# Patient Record
Sex: MALE | Race: WHITE | NOT HISPANIC OR LATINO | Employment: FULL TIME | URBAN - METROPOLITAN AREA
[De-identification: names, ages, dates, MRNs, and addresses within clinical notes are randomized per-mention and may not be internally consistent; named-entity substitution may affect disease eponyms.]

---

## 2020-05-14 ENCOUNTER — LAB VISIT (OUTPATIENT)
Dept: PRIMARY CARE CLINIC | Facility: CLINIC | Age: 30
End: 2020-05-14
Payer: COMMERCIAL

## 2020-05-14 DIAGNOSIS — Z00.6 RESEARCH STUDY PATIENT: Primary | ICD-10-CM

## 2020-05-14 PROCEDURE — U0002 COVID-19 LAB TEST NON-CDC: HCPCS

## 2020-05-14 PROCEDURE — 86769 SARS-COV-2 COVID-19 ANTIBODY: CPT

## 2020-05-14 NOTE — RESEARCH
Date of Consent: 05/14/2020    Sponsor: [facility]    Study Title/IRB Number: Observational study of Sars-CoV2 Immunoglobulin G (IgG) seroprevalence among the [facility] population over time 2020.163  Principle Investigator: Alfreda Love, PhD    Did the patient need translation services? No   name: N/A    Prior to the Informed Consent (IC) being signed, or any study protocol required data collection, testing, procedure, or intervention being performed, the following was done and/or discussed:   Patient was given a paper copy of the IC for review    Patient was given study FAQ   Purpose of the study and qualifications to participate    Study design and tests or procedures done at this visit   Confidentiality and HIPAA Authorization for Release of Medical Records for the research trial/ subject's rights/research related injury   Risk, Benefits, Alternative Treatments, Compensation and Costs   Participation in the research trial is voluntary and patient may withdraw at anytime   Contact information for study related questions    Patient verbalizes understanding of the above: Yes  Contact information for PI and IRB given to patient: Yes  Patient able to adequately summarize: the purpose of the study, the risks associated with the study, and all procedures, testing, and follow-ups associated with the study: Yes    The consent was discussed verbally with the patient and all questions were answered satisfactorily. Patient gave verbal consent for the Seroprevalence research study with an IRB approval date of 05/08/2020.      The Consent, Consent Witness and name of Clinical Research Coordinator consenting was captured and documented in REDCap.    All Inclusion and Exclusion Criteria reviewed, subject meets all Inclusion criteria and does not meet any Exclusion Criteria at this time.     Patient Eligibility was confirmed.    Patient responded to survey questions.    The following biospecimen  collection procedures were collected:    -Nasopharyngeal Swab Collection  -Blood collection

## 2020-05-15 LAB
SARS-COV-2 IGG SERPLBLD QL IA.RAPID: NEGATIVE
SARS-COV-2 RNA RESP QL NAA+PROBE: NOT DETECTED

## 2020-07-10 ENCOUNTER — OFFICE VISIT (OUTPATIENT)
Dept: OPTOMETRY | Facility: CLINIC | Age: 30
End: 2020-07-10
Attending: SURGERY
Payer: COMMERCIAL

## 2020-07-10 DIAGNOSIS — H52.4 MYOPIA WITH ASTIGMATISM AND PRESBYOPIA, BILATERAL: Primary | ICD-10-CM

## 2020-07-10 DIAGNOSIS — H52.203 MYOPIA WITH ASTIGMATISM AND PRESBYOPIA, BILATERAL: Primary | ICD-10-CM

## 2020-07-10 DIAGNOSIS — H17.9 CORNEAL SCAR: ICD-10-CM

## 2020-07-10 DIAGNOSIS — H18.823 CONTACT LENS OVERWEAR OF BOTH EYES: ICD-10-CM

## 2020-07-10 DIAGNOSIS — H52.13 MYOPIA WITH ASTIGMATISM AND PRESBYOPIA, BILATERAL: Primary | ICD-10-CM

## 2020-07-10 PROCEDURE — 92310 PR CONTACT LENS FITTING (NO CHANGE): ICD-10-PCS | Mod: CSM,S$GLB,, | Performed by: OPTOMETRIST

## 2020-07-10 PROCEDURE — 99999 PR PBB SHADOW E&M-EST. PATIENT-LVL II: CPT | Mod: PBBFAC,,, | Performed by: OPTOMETRIST

## 2020-07-10 PROCEDURE — 92015 DETERMINE REFRACTIVE STATE: CPT | Mod: S$GLB,,, | Performed by: OPTOMETRIST

## 2020-07-10 PROCEDURE — 99999 PR PBB SHADOW E&M-EST. PATIENT-LVL II: ICD-10-PCS | Mod: PBBFAC,,, | Performed by: OPTOMETRIST

## 2020-07-10 PROCEDURE — 92004 PR EYE EXAM, NEW PATIENT,COMPREHESV: ICD-10-PCS | Mod: S$GLB,,, | Performed by: OPTOMETRIST

## 2020-07-10 PROCEDURE — 92004 COMPRE OPH EXAM NEW PT 1/>: CPT | Mod: S$GLB,,, | Performed by: OPTOMETRIST

## 2020-07-10 PROCEDURE — 92015 PR REFRACTION: ICD-10-PCS | Mod: S$GLB,,, | Performed by: OPTOMETRIST

## 2020-07-10 PROCEDURE — 92310 CONTACT LENS FITTING OU: CPT | Mod: CSM,S$GLB,, | Performed by: OPTOMETRIST

## 2020-07-10 NOTE — PROGRESS NOTES
HPI     Mr. Hollis Miranda II was referred by self for a CL fit.    Patient complains of needs new Rx for contacts and glasses. Wearing   biofinity toric. Likes comfort and vision with CLs.     Would patient like a refraction today? yes     Paitent denies diplopia, headaches, flashes/floaters, itching, tearing,   burning, redness, and pain.    (-)drops    (-)diabetes    OCULAR HISTORY  Last Eye Exam: 1 year with yovany  (-)eye surgery   (-)diagnosed or treated for any eye conditions or diseases, n/a    FAMILY HISTORY  (-)Glaucoma        Last edited by Yaneth Sotelo, OD on 7/10/2020  2:25 PM. (History)            Assessment /Plan     For exam results, see Encounter Report.    Myopia with astigmatism and presbyopia, bilateral    Contact lens overwear of both eyes    Corneal scar      Updated SRx. Monitor yearly.   Updated CL Rx, no change from habitual. Great fit and vision. Signs of [place] OU. Pt reports to not sleep in lenses, however, showing multiple signs of CL abuse. Corneal scarring OU similar to old infiltrate/ulcer however pt denies any past infections.   He notes long days wear in CL, such 16-18 hours. He is not interested in changing to a daily CL even after education on the benefits. Recommend inserting CLs right before work and removing when home from work. Also recommend CL holidays on the weekend. Re-educated on proper CL care and hygiene. Monitor yearly.       RTC in 1 year for annual eye exam or sooner if needed.

## 2021-02-24 ENCOUNTER — OFFICE VISIT (OUTPATIENT)
Dept: URGENT CARE | Facility: CLINIC | Age: 31
End: 2021-02-24
Payer: COMMERCIAL

## 2021-02-24 VITALS
OXYGEN SATURATION: 96 % | TEMPERATURE: 102 F | BODY MASS INDEX: 23.8 KG/M2 | WEIGHT: 170 LBS | RESPIRATION RATE: 18 BRPM | SYSTOLIC BLOOD PRESSURE: 129 MMHG | DIASTOLIC BLOOD PRESSURE: 78 MMHG | HEART RATE: 132 BPM | HEIGHT: 71 IN

## 2021-02-24 DIAGNOSIS — J02.9 SORE THROAT: ICD-10-CM

## 2021-02-24 DIAGNOSIS — R50.9 FEVER, UNSPECIFIED FEVER CAUSE: ICD-10-CM

## 2021-02-24 DIAGNOSIS — R53.83 FATIGUE, UNSPECIFIED TYPE: ICD-10-CM

## 2021-02-24 DIAGNOSIS — J02.0 STREP PHARYNGITIS: Primary | ICD-10-CM

## 2021-02-24 LAB
CTP QC/QA: YES
HETEROPH AB SER QL: NEGATIVE
MOLECULAR STREP A: POSITIVE
POC MOLECULAR INFLUENZA A AGN: NEGATIVE
POC MOLECULAR INFLUENZA B AGN: NEGATIVE
SARS-COV-2 RDRP RESP QL NAA+PROBE: NEGATIVE

## 2021-02-24 PROCEDURE — U0002: ICD-10-PCS | Mod: QW,S$GLB,, | Performed by: NURSE PRACTITIONER

## 2021-02-24 PROCEDURE — 99203 OFFICE O/P NEW LOW 30 MIN: CPT | Mod: S$GLB,,, | Performed by: NURSE PRACTITIONER

## 2021-02-24 PROCEDURE — 86308 POCT INFECTIOUS MONONUCLEOSIS: ICD-10-PCS | Mod: QW,S$GLB,, | Performed by: NURSE PRACTITIONER

## 2021-02-24 PROCEDURE — 87651 STREP A DNA AMP PROBE: CPT | Mod: QW,S$GLB,, | Performed by: NURSE PRACTITIONER

## 2021-02-24 PROCEDURE — 99203 PR OFFICE/OUTPT VISIT, NEW, LEVL III, 30-44 MIN: ICD-10-PCS | Mod: S$GLB,,, | Performed by: NURSE PRACTITIONER

## 2021-02-24 PROCEDURE — 3008F BODY MASS INDEX DOCD: CPT | Mod: CPTII,S$GLB,, | Performed by: NURSE PRACTITIONER

## 2021-02-24 PROCEDURE — 87502 POCT INFLUENZA A/B MOLECULAR: ICD-10-PCS | Mod: QW,S$GLB,, | Performed by: NURSE PRACTITIONER

## 2021-02-24 PROCEDURE — 87502 INFLUENZA DNA AMP PROBE: CPT | Mod: QW,S$GLB,, | Performed by: NURSE PRACTITIONER

## 2021-02-24 PROCEDURE — U0002 COVID-19 LAB TEST NON-CDC: HCPCS | Mod: QW,S$GLB,, | Performed by: NURSE PRACTITIONER

## 2021-02-24 PROCEDURE — 87651 POCT STREP A MOLECULAR: ICD-10-PCS | Mod: QW,S$GLB,, | Performed by: NURSE PRACTITIONER

## 2021-02-24 PROCEDURE — 86308 HETEROPHILE ANTIBODY SCREEN: CPT | Mod: QW,S$GLB,, | Performed by: NURSE PRACTITIONER

## 2021-02-24 PROCEDURE — 3008F PR BODY MASS INDEX (BMI) DOCUMENTED: ICD-10-PCS | Mod: CPTII,S$GLB,, | Performed by: NURSE PRACTITIONER

## 2021-02-24 RX ORDER — ACETAMINOPHEN 500 MG
1000 TABLET ORAL
Status: COMPLETED | OUTPATIENT
Start: 2021-02-24 | End: 2021-02-24

## 2021-02-24 RX ORDER — PENICILLIN V POTASSIUM 500 MG/1
500 TABLET, FILM COATED ORAL 2 TIMES DAILY
Qty: 20 TABLET | Refills: 0 | Status: SHIPPED | OUTPATIENT
Start: 2021-02-24 | End: 2022-05-18

## 2021-02-24 RX ORDER — PENICILLIN V POTASSIUM 500 MG/1
500 TABLET, FILM COATED ORAL 2 TIMES DAILY
Qty: 20 TABLET | Refills: 0 | Status: SHIPPED | OUTPATIENT
Start: 2021-02-24 | End: 2021-02-24

## 2021-02-24 RX ADMIN — Medication 1000 MG: at 08:02

## 2021-04-15 ENCOUNTER — PATIENT MESSAGE (OUTPATIENT)
Dept: RESEARCH | Facility: HOSPITAL | Age: 31
End: 2021-04-15

## 2021-08-04 ENCOUNTER — IMMUNIZATION (OUTPATIENT)
Dept: PRIMARY CARE CLINIC | Facility: CLINIC | Age: 31
End: 2021-08-04
Payer: COMMERCIAL

## 2021-08-04 DIAGNOSIS — Z23 NEED FOR VACCINATION: Primary | ICD-10-CM

## 2021-08-04 PROCEDURE — 0001A COVID-19, MRNA, LNP-S, PF, 30 MCG/0.3 ML DOSE VACCINE: ICD-10-PCS | Mod: CV19,S$GLB,, | Performed by: INTERNAL MEDICINE

## 2021-08-04 PROCEDURE — 0001A COVID-19, MRNA, LNP-S, PF, 30 MCG/0.3 ML DOSE VACCINE: CPT | Mod: CV19,S$GLB,, | Performed by: INTERNAL MEDICINE

## 2021-08-04 PROCEDURE — 91300 COVID-19, MRNA, LNP-S, PF, 30 MCG/0.3 ML DOSE VACCINE: CPT | Mod: S$GLB,,, | Performed by: INTERNAL MEDICINE

## 2021-08-04 PROCEDURE — 91300 COVID-19, MRNA, LNP-S, PF, 30 MCG/0.3 ML DOSE VACCINE: ICD-10-PCS | Mod: S$GLB,,, | Performed by: INTERNAL MEDICINE

## 2021-09-24 ENCOUNTER — IMMUNIZATION (OUTPATIENT)
Dept: PRIMARY CARE CLINIC | Facility: CLINIC | Age: 31
End: 2021-09-24
Payer: COMMERCIAL

## 2021-09-24 DIAGNOSIS — Z23 NEED FOR VACCINATION: Primary | ICD-10-CM

## 2021-09-24 PROCEDURE — 91300 COVID-19, MRNA, LNP-S, PF, 30 MCG/0.3 ML DOSE VACCINE: CPT | Mod: PBBFAC | Performed by: INTERNAL MEDICINE

## 2021-09-24 PROCEDURE — 0002A COVID-19, MRNA, LNP-S, PF, 30 MCG/0.3 ML DOSE VACCINE: CPT | Mod: CV19,PBBFAC | Performed by: INTERNAL MEDICINE

## 2021-11-19 ENCOUNTER — CLINICAL SUPPORT (OUTPATIENT)
Dept: URGENT CARE | Facility: CLINIC | Age: 31
End: 2021-11-19
Payer: COMMERCIAL

## 2021-11-19 DIAGNOSIS — Z01.812 ENCOUNTER FOR SCREENING LABORATORY TESTING FOR COVID-19 VIRUS IN ASYMPTOMATIC PATIENT: Primary | ICD-10-CM

## 2021-11-19 DIAGNOSIS — Z11.52 ENCOUNTER FOR SCREENING LABORATORY TESTING FOR COVID-19 VIRUS IN ASYMPTOMATIC PATIENT: Primary | ICD-10-CM

## 2021-11-19 LAB
CTP QC/QA: YES
SARS-COV-2 RDRP RESP QL NAA+PROBE: NEGATIVE

## 2021-11-19 PROCEDURE — U0002: ICD-10-PCS | Mod: QW,S$GLB,, | Performed by: STUDENT IN AN ORGANIZED HEALTH CARE EDUCATION/TRAINING PROGRAM

## 2021-11-19 PROCEDURE — 99211 PR OFFICE/OUTPT VISIT, EST, LEVL I: ICD-10-PCS | Mod: S$GLB,,, | Performed by: STUDENT IN AN ORGANIZED HEALTH CARE EDUCATION/TRAINING PROGRAM

## 2021-11-19 PROCEDURE — U0002 COVID-19 LAB TEST NON-CDC: HCPCS | Mod: QW,S$GLB,, | Performed by: STUDENT IN AN ORGANIZED HEALTH CARE EDUCATION/TRAINING PROGRAM

## 2021-11-19 PROCEDURE — 99211 OFF/OP EST MAY X REQ PHY/QHP: CPT | Mod: S$GLB,,, | Performed by: STUDENT IN AN ORGANIZED HEALTH CARE EDUCATION/TRAINING PROGRAM

## 2021-12-22 ENCOUNTER — OFFICE VISIT (OUTPATIENT)
Dept: URGENT CARE | Facility: CLINIC | Age: 31
End: 2021-12-22
Payer: COMMERCIAL

## 2021-12-22 VITALS
TEMPERATURE: 99 F | HEART RATE: 109 BPM | HEIGHT: 71 IN | WEIGHT: 175 LBS | DIASTOLIC BLOOD PRESSURE: 89 MMHG | SYSTOLIC BLOOD PRESSURE: 138 MMHG | BODY MASS INDEX: 24.5 KG/M2 | OXYGEN SATURATION: 97 % | RESPIRATION RATE: 18 BRPM

## 2021-12-22 DIAGNOSIS — J06.9 VIRAL URI: Primary | ICD-10-CM

## 2021-12-22 DIAGNOSIS — R05.9 COUGH: ICD-10-CM

## 2021-12-22 LAB
CTP QC/QA: YES
SARS-COV-2 RDRP RESP QL NAA+PROBE: NEGATIVE

## 2021-12-22 PROCEDURE — 99213 PR OFFICE/OUTPT VISIT, EST, LEVL III, 20-29 MIN: ICD-10-PCS | Mod: S$GLB,CS,, | Performed by: NURSE PRACTITIONER

## 2021-12-22 PROCEDURE — 1159F PR MEDICATION LIST DOCUMENTED IN MEDICAL RECORD: ICD-10-PCS | Mod: CPTII,S$GLB,, | Performed by: NURSE PRACTITIONER

## 2021-12-22 PROCEDURE — 1160F PR REVIEW ALL MEDS BY PRESCRIBER/CLIN PHARMACIST DOCUMENTED: ICD-10-PCS | Mod: CPTII,S$GLB,, | Performed by: NURSE PRACTITIONER

## 2021-12-22 PROCEDURE — 1159F MED LIST DOCD IN RCRD: CPT | Mod: CPTII,S$GLB,, | Performed by: NURSE PRACTITIONER

## 2021-12-22 PROCEDURE — 3008F BODY MASS INDEX DOCD: CPT | Mod: CPTII,S$GLB,, | Performed by: NURSE PRACTITIONER

## 2021-12-22 PROCEDURE — 3008F PR BODY MASS INDEX (BMI) DOCUMENTED: ICD-10-PCS | Mod: CPTII,S$GLB,, | Performed by: NURSE PRACTITIONER

## 2021-12-22 PROCEDURE — U0002 COVID-19 LAB TEST NON-CDC: HCPCS | Mod: QW,S$GLB,, | Performed by: NURSE PRACTITIONER

## 2021-12-22 PROCEDURE — U0002: ICD-10-PCS | Mod: QW,S$GLB,, | Performed by: NURSE PRACTITIONER

## 2021-12-22 PROCEDURE — 3079F PR MOST RECENT DIASTOLIC BLOOD PRESSURE 80-89 MM HG: ICD-10-PCS | Mod: CPTII,S$GLB,, | Performed by: NURSE PRACTITIONER

## 2021-12-22 PROCEDURE — 3075F PR MOST RECENT SYSTOLIC BLOOD PRESS GE 130-139MM HG: ICD-10-PCS | Mod: CPTII,S$GLB,, | Performed by: NURSE PRACTITIONER

## 2021-12-22 PROCEDURE — 3079F DIAST BP 80-89 MM HG: CPT | Mod: CPTII,S$GLB,, | Performed by: NURSE PRACTITIONER

## 2021-12-22 PROCEDURE — 3075F SYST BP GE 130 - 139MM HG: CPT | Mod: CPTII,S$GLB,, | Performed by: NURSE PRACTITIONER

## 2021-12-22 PROCEDURE — 99213 OFFICE O/P EST LOW 20 MIN: CPT | Mod: S$GLB,CS,, | Performed by: NURSE PRACTITIONER

## 2021-12-22 PROCEDURE — 1160F RVW MEDS BY RX/DR IN RCRD: CPT | Mod: CPTII,S$GLB,, | Performed by: NURSE PRACTITIONER

## 2022-01-19 ENCOUNTER — OFFICE VISIT (OUTPATIENT)
Dept: OPTOMETRY | Facility: CLINIC | Age: 32
End: 2022-01-19
Payer: COMMERCIAL

## 2022-01-19 DIAGNOSIS — H52.13 MYOPIC ASTIGMATISM OF BOTH EYES: Primary | ICD-10-CM

## 2022-01-19 DIAGNOSIS — Z97.3 WEARS CONTACT LENSES: ICD-10-CM

## 2022-01-19 DIAGNOSIS — H52.203 MYOPIC ASTIGMATISM OF BOTH EYES: Primary | ICD-10-CM

## 2022-01-19 PROCEDURE — 99999 PR PBB SHADOW E&M-EST. PATIENT-LVL II: ICD-10-PCS | Mod: PBBFAC,,,

## 2022-01-19 PROCEDURE — 92310 PR CONTACT LENS FITTING (NO CHANGE): ICD-10-PCS | Mod: S$GLB,,,

## 2022-01-19 PROCEDURE — 1160F PR REVIEW ALL MEDS BY PRESCRIBER/CLIN PHARMACIST DOCUMENTED: ICD-10-PCS | Mod: CPTII,S$GLB,,

## 2022-01-19 PROCEDURE — 92014 PR EYE EXAM, EST PATIENT,COMPREHESV: ICD-10-PCS | Mod: S$GLB,,,

## 2022-01-19 PROCEDURE — 92015 DETERMINE REFRACTIVE STATE: CPT | Mod: S$GLB,,,

## 2022-01-19 PROCEDURE — 92014 COMPRE OPH EXAM EST PT 1/>: CPT | Mod: S$GLB,,,

## 2022-01-19 PROCEDURE — 1159F MED LIST DOCD IN RCRD: CPT | Mod: CPTII,S$GLB,,

## 2022-01-19 PROCEDURE — 1160F RVW MEDS BY RX/DR IN RCRD: CPT | Mod: CPTII,S$GLB,,

## 2022-01-19 PROCEDURE — 92015 PR REFRACTION: ICD-10-PCS | Mod: S$GLB,,,

## 2022-01-19 PROCEDURE — 92310 CONTACT LENS FITTING OU: CPT | Mod: S$GLB,,,

## 2022-01-19 PROCEDURE — 99999 PR PBB SHADOW E&M-EST. PATIENT-LVL II: CPT | Mod: PBBFAC,,,

## 2022-01-19 PROCEDURE — 1159F PR MEDICATION LIST DOCUMENTED IN MEDICAL RECORD: ICD-10-PCS | Mod: CPTII,S$GLB,,

## 2022-01-19 NOTE — PROGRESS NOTES
HPI     Patient presents for annual eye exam. Would like updated CL. Habitual   lenses are Biofinity Toric. Vision and comfort with lenses are good.   Tosses lenses out every 1-2 months. Current pair of CL are about 1.5   months old.   Occasionally sleeps in CL. No changes to vision at dist or near. No other   ocular or visual complaints.     Would like to know more about LASIK.    Last edited by Ryan Batres, OD on 1/19/2022 11:11 AM. (History)            Assessment /Plan     For exam results, see Encounter Report.    Myopic astigmatism, bilateral  Glasses Prescription (1/19/2022)        Sphere Cylinder [place] Dist [facility]    Right -1.50 +3.00 005 20/20    Left -0.75 +2.50 175 20/20    Type: SVL    Expiration Date: 1/20/2023        Contact Lens Prescription (1/19/2022)        Brand Base Curve Diameter Sphere Cylinder Axis    Right biofinity toric 8.7 14.5 +1.50 -2.25 100    Left biofinity toric 8.7 14.5 +1.50 -2.25 70    Expiration Date: 1/20/2023    Replacement: Monthly    Solutions: OptiFree PureMoist    Wearing Schedule: Daily wear        -Spectacle Rx and CL Rx given, discussed different options for glasses.   -Stressed importance of monthly disposal and not sleeping in lenses.    -Referred patient to  for LASIK consult    Wears contact lenses    RTC 1 year routine eye exam with DFE.

## 2022-05-18 ENCOUNTER — OFFICE VISIT (OUTPATIENT)
Dept: INTERNAL MEDICINE | Facility: CLINIC | Age: 32
End: 2022-05-18
Payer: COMMERCIAL

## 2022-05-18 VITALS
OXYGEN SATURATION: 98 % | BODY MASS INDEX: 26.04 KG/M2 | TEMPERATURE: 98 F | WEIGHT: 181.88 LBS | HEIGHT: 70 IN | RESPIRATION RATE: 19 BRPM | SYSTOLIC BLOOD PRESSURE: 116 MMHG | DIASTOLIC BLOOD PRESSURE: 88 MMHG | HEART RATE: 73 BPM

## 2022-05-18 DIAGNOSIS — F90.2 ADHD (ATTENTION DEFICIT HYPERACTIVITY DISORDER), COMBINED TYPE: ICD-10-CM

## 2022-05-18 DIAGNOSIS — Z00.00 ANNUAL PHYSICAL EXAM: Primary | ICD-10-CM

## 2022-05-18 PROCEDURE — 1160F RVW MEDS BY RX/DR IN RCRD: CPT | Mod: CPTII,S$GLB,, | Performed by: HOSPITALIST

## 2022-05-18 PROCEDURE — 99999 PR PBB SHADOW E&M-EST. PATIENT-LVL IV: CPT | Mod: PBBFAC,,, | Performed by: HOSPITALIST

## 2022-05-18 PROCEDURE — 1159F PR MEDICATION LIST DOCUMENTED IN MEDICAL RECORD: ICD-10-PCS | Mod: CPTII,S$GLB,, | Performed by: HOSPITALIST

## 2022-05-18 PROCEDURE — 3074F PR MOST RECENT SYSTOLIC BLOOD PRESSURE < 130 MM HG: ICD-10-PCS | Mod: CPTII,S$GLB,, | Performed by: HOSPITALIST

## 2022-05-18 PROCEDURE — 3074F SYST BP LT 130 MM HG: CPT | Mod: CPTII,S$GLB,, | Performed by: HOSPITALIST

## 2022-05-18 PROCEDURE — 3079F DIAST BP 80-89 MM HG: CPT | Mod: CPTII,S$GLB,, | Performed by: HOSPITALIST

## 2022-05-18 PROCEDURE — 99999 PR PBB SHADOW E&M-EST. PATIENT-LVL IV: ICD-10-PCS | Mod: PBBFAC,,, | Performed by: HOSPITALIST

## 2022-05-18 PROCEDURE — 99385 PR PREVENTIVE VISIT,NEW,18-39: ICD-10-PCS | Mod: S$GLB,,, | Performed by: HOSPITALIST

## 2022-05-18 PROCEDURE — 3008F PR BODY MASS INDEX (BMI) DOCUMENTED: ICD-10-PCS | Mod: CPTII,S$GLB,, | Performed by: HOSPITALIST

## 2022-05-18 PROCEDURE — 99385 PREV VISIT NEW AGE 18-39: CPT | Mod: S$GLB,,, | Performed by: HOSPITALIST

## 2022-05-18 PROCEDURE — 3079F PR MOST RECENT DIASTOLIC BLOOD PRESSURE 80-89 MM HG: ICD-10-PCS | Mod: CPTII,S$GLB,, | Performed by: HOSPITALIST

## 2022-05-18 PROCEDURE — 1160F PR REVIEW ALL MEDS BY PRESCRIBER/CLIN PHARMACIST DOCUMENTED: ICD-10-PCS | Mod: CPTII,S$GLB,, | Performed by: HOSPITALIST

## 2022-05-18 PROCEDURE — 3008F BODY MASS INDEX DOCD: CPT | Mod: CPTII,S$GLB,, | Performed by: HOSPITALIST

## 2022-05-18 PROCEDURE — 1159F MED LIST DOCD IN RCRD: CPT | Mod: CPTII,S$GLB,, | Performed by: HOSPITALIST

## 2022-05-18 RX ORDER — DEXTROAMPHETAMINE SACCHARATE, AMPHETAMINE ASPARTATE MONOHYDRATE, DEXTROAMPHETAMINE SULFATE AND AMPHETAMINE SULFATE 3.75; 3.75; 3.75; 3.75 MG/1; MG/1; MG/1; MG/1
15 CAPSULE, EXTENDED RELEASE ORAL EVERY MORNING
Qty: 30 CAPSULE | Refills: 0 | Status: SHIPPED | OUTPATIENT
Start: 2022-05-18 | End: 2022-06-22 | Stop reason: SDUPTHER

## 2022-05-18 NOTE — PROGRESS NOTES
Subjective:     @Patient ID: Hollis Miranda II is a 31 y.o. male.    Chief Complaint: Establish Care and ADHD (Pt recently dx adhd)    HPI    30 yo male presents for annual, est care. New to me. He is an . Works for [employer].  with 3 kids     1. ADHD: diagnosed by psychologist Dr Joya De La Torre 1/20/22 with ADHD combined type.   Reports noticed it while taking the professional of  tests he needs to be promoted. Has not been able to pass the test because he has not been able to finish it. Reports in school he has had difficulty with test taking. States that he did use a friend's adderall few times in college to help him take tests.   Reports easily distracted. Difficulty completing tasks.     Lipid disorders/ASCVD risk (ages >/= 45 or >/= 20 if increased risk ): ordered  Eye exam:  Wears contacts. Goes yearly      Vaccines:   Influenza (yearly)    Tetanus (every 10 yrs - 1st tdap) utd   Covid19: due for booster    Exercise: walking, stays active  Diet:     Review of Systems   Constitutional: Negative for chills and fever.   HENT: Negative for congestion and sore throat.    Eyes: Negative for pain and visual disturbance.   Respiratory: Negative for cough and shortness of breath.    Cardiovascular: Negative for chest pain and leg swelling.   Gastrointestinal: Negative for abdominal pain, nausea and vomiting.   Endocrine: Negative for polydipsia and polyuria.   Genitourinary: Negative for difficulty urinating and dysuria.   Musculoskeletal: Negative for arthralgias and back pain.   Skin: Negative for rash and wound.   Neurological: Negative for weakness and headaches.   Psychiatric/Behavioral: Negative for agitation and confusion.     Past medical history, surgical history, and family medical history reviewed and updated as appropriate.    Medications and allergies reviewed.     Objective:     There were no vitals filed for this visit.  There is no height or weight on file to  calculate BMI.  Physical Exam  Vitals reviewed.   Constitutional:       General: He is not in acute distress.     Appearance: He is well-developed.   HENT:      Head: Normocephalic and atraumatic.      Right Ear: Tympanic membrane normal.      Left Ear: Tympanic membrane normal.      Mouth/Throat:      Mouth: Mucous membranes are moist.      Pharynx: No oropharyngeal exudate.   Eyes:      General:         Right eye: No discharge.         Left eye: No discharge.      Conjunctiva/sclera: Conjunctivae normal.   Cardiovascular:      Rate and Rhythm: Normal rate and regular rhythm.      Heart sounds: No murmur heard.    No friction rub.   Pulmonary:      Effort: Pulmonary effort is normal.      Breath sounds: Normal breath sounds.   Abdominal:      General: Bowel sounds are normal. There is no distension.      Palpations: Abdomen is soft.      Tenderness: There is no abdominal tenderness.   Musculoskeletal:         General: Normal range of motion.      Cervical back: Normal range of motion and neck supple.      Right lower leg: No edema.      Left lower leg: No edema.   Lymphadenopathy:      Cervical: No cervical adenopathy.   Skin:     General: Skin is warm and dry.   Neurological:      Mental Status: He is alert and oriented to person, place, and time.   Psychiatric:         Mood and Affect: Mood normal.         Behavior: Behavior normal.         No results found for: WBC, HGB, HCT, PLT, CHOL, TRIG, HDL, LDLDIRECT, ALT, AST, NA, K, CL, CREATININE, BUN, CO2, TSH, PSA, INR, GLUF, HGBA1C, MICROALBUR    Assessment:     1. Annual physical exam    2. ADHD (attention deficit hyperactivity disorder), combined type      Plan:   Hollis was seen today for [facility] care and adhd.    Diagnoses and all orders for this visit:    Annual physical exam  -     Comprehensive Metabolic Panel; Future  -     CBC Auto Differential; Future  -     TSH; Future  -     Lipid Panel; Future  -     Hepatitis C Antibody; Future  -     HIV 1/2 Ag/Ab  (4th Gen); Future    ADHD (attention deficit hyperactivity disorder), combined type  - I reviewed outside records from psychologist office. Will start medication adderall xr 15 mg qday. Pt counseled that medication is a controlled substance. Counseled on possible side effects including increase in bp, palpitations, decreased appetite, constipation, insomnia etc. Counseled to not share medication with anyone. If loses script, will not refill until next fill due date. Will have office visits/virtual visits q3 months for refills. Notify MD if any negative side effects. Pt expressed understanding. I reviewed the  and pt appears to be compliant     Other orders  -     dextroamphetamine-amphetamine (ADDERALL XR) 15 MG 24 hr capsule; Take 1 capsule (15 mg total) by mouth every morning.          RTC 3 months and prn     Ayse Wilson MD  Internal Medicine    5/18/2022

## 2022-07-26 RX ORDER — DEXTROAMPHETAMINE SACCHARATE, AMPHETAMINE ASPARTATE MONOHYDRATE, DEXTROAMPHETAMINE SULFATE AND AMPHETAMINE SULFATE 3.75; 3.75; 3.75; 3.75 MG/1; MG/1; MG/1; MG/1
15 CAPSULE, EXTENDED RELEASE ORAL EVERY MORNING
Qty: 30 CAPSULE | Refills: 0 | Status: CANCELLED | OUTPATIENT
Start: 2022-07-26

## 2022-07-26 NOTE — TELEPHONE ENCOUNTER
No new care gaps identified.  [facility] Embedded Care Gaps. Reference number: [id number]. 7/26/2022   6:52:07 AM HOLLIET

## 2022-07-28 ENCOUNTER — PATIENT MESSAGE (OUTPATIENT)
Dept: INTERNAL MEDICINE | Facility: CLINIC | Age: 32
End: 2022-07-28
Payer: COMMERCIAL

## 2022-07-29 ENCOUNTER — PATIENT MESSAGE (OUTPATIENT)
Dept: INTERNAL MEDICINE | Facility: CLINIC | Age: 32
End: 2022-07-29
Payer: COMMERCIAL

## 2022-08-02 ENCOUNTER — PATIENT MESSAGE (OUTPATIENT)
Dept: OPTOMETRY | Facility: CLINIC | Age: 32
End: 2022-08-02
Payer: COMMERCIAL

## 2022-08-17 ENCOUNTER — PATIENT MESSAGE (OUTPATIENT)
Dept: INTERNAL MEDICINE | Facility: CLINIC | Age: 32
End: 2022-08-17
Payer: COMMERCIAL

## 2022-08-18 ENCOUNTER — OFFICE VISIT (OUTPATIENT)
Dept: INTERNAL MEDICINE | Facility: CLINIC | Age: 32
End: 2022-08-18
Payer: COMMERCIAL

## 2022-08-18 DIAGNOSIS — F90.2 ADHD (ATTENTION DEFICIT HYPERACTIVITY DISORDER), COMBINED TYPE: Primary | ICD-10-CM

## 2022-08-18 DIAGNOSIS — E78.5 HYPERLIPIDEMIA, UNSPECIFIED HYPERLIPIDEMIA TYPE: ICD-10-CM

## 2022-08-18 LAB
ALBUMIN: 5 G/DL (ref 3.5–5)
ALP SERPL-CCNC: 50 U/L (ref 38–126)
ALT SERPL W P-5'-P-CCNC: 10 U/L (ref 7–56)
ANION GAP SERPL CALC-SCNC: 16.2 MMOL/L (ref 9–18)
AST SERPL-CCNC: 11 U/L (ref 7–40)
BASOPHILS ABSOLUTE COUNT: 0 THOUSAND/UL (ref 0–0.2)
BASOPHILS NFR BLD: 0.9 % (ref 0–2)
BILIRUB SERPL-MCNC: 0.7 MG/DL (ref 0–1.2)
BUN BLD-MCNC: 13 MG/DL (ref 7–21)
BUN/CREAT SERPL: 15 (ref 6–22)
CALC OSMOLALITY: 281 MOSM/KG (ref 275–295)
CALCIUM SERPL-MCNC: 9.9 MG/DL (ref 8.5–10.5)
CHLORIDE SERPL-SCNC: 101 MMOL/L (ref 98–107)
CHOL/HDLC RATIO: 6.06
CHOLEST SERPL-MSCNC: 200 MG/DL (ref 100–180)
CO2 SERPL-SCNC: 28 MMOL/L (ref 21–31)
CREAT SERPL-MCNC: 0.86 MG/DL (ref 0.7–1.2)
EGFR: 133 ML/MIN
EOSINOPHIL NFR BLD: 7.8 % (ref 0–4)
EOSINOPHILS ABSOLUTE COUNT: 0.4 THOUSAND/UL (ref 0–0.7)
ERYTHROCYTE [DISTWIDTH] IN BLOOD BY AUTOMATED COUNT: 13.9 % (ref 12–15.3)
GFR: 115 ML/MIN
GLUCOSE SERPL-MCNC: 88 MG/DL (ref 70–100)
HCT VFR BLD AUTO: 47.9 % (ref 40–52)
HCV AB S/CO SERPL IA: 0.09
HCV AB SERPL QL IA: NORMAL
HDLC SERPL-MCNC: 33 MG/DL (ref 40–75)
HGB BLD-MCNC: 16.4 GM/DL (ref 13.6–17.5)
HIV 1+2 AB+HIV1 P24 AG SERPL QL IA: NORMAL
LDLC SERPL CALC-MCNC: 136 MG/DL (ref 0–105)
LYMPHOCYTES %: 42.1 % (ref 15–45)
LYMPHOCYTES ABSOLUTE COUNT: 2.3 THOUSAND/UL (ref 1–4.2)
MCH RBC QN AUTO: 30.3 PG (ref 27–33)
MCHC RBC AUTO-ENTMCNC: 34.2 G/DL (ref 32–36)
MCV RBC AUTO: 88.6 FL (ref 80–94)
MONOCYTES %: 8.4 % (ref 3–13)
MONOCYTES ABSOLUTE COUNT: 0.5 THOUSAND/UL (ref 0.1–0.8)
NEUTROPHILS ABSOLUTE COUNT: 2.2 THOUSAND/UL (ref 2.1–7.6)
NEUTROPHILS RELATIVE PERCENT: 40.8 % (ref 32–80)
PLATELET # BLD AUTO: 204 THOUSAND/UL (ref 150–350)
PMV BLD AUTO: 9.6 FL (ref 7–10.2)
POTASSIUM SERPL-SCNC: 4.2 MMOL/L (ref 3.5–5)
RBC # BLD AUTO: 5.41 MILLION/UL (ref 4.45–5.9)
SODIUM BLD-SCNC: 141 MMOL/L (ref 135–145)
TOTAL PROTEIN: 7.5 G/DL (ref 6.3–8.2)
TRIGL SERPL-MCNC: 196 MG/DL (ref 30–150)
TSH SERPL DL<=0.005 MIU/L-ACNC: 2.67 UIU/ML (ref 0.35–4)
WBC # BLD AUTO: 5.4 THOUSAND/UL (ref 4.5–11)

## 2022-08-18 PROCEDURE — 99213 OFFICE O/P EST LOW 20 MIN: CPT | Mod: 95,,, | Performed by: HOSPITALIST

## 2022-08-18 PROCEDURE — 1159F MED LIST DOCD IN RCRD: CPT | Mod: CPTII,95,, | Performed by: HOSPITALIST

## 2022-08-18 PROCEDURE — 99213 PR OFFICE/OUTPT VISIT, EST, LEVL III, 20-29 MIN: ICD-10-PCS | Mod: 95,,, | Performed by: HOSPITALIST

## 2022-08-18 PROCEDURE — 1159F PR MEDICATION LIST DOCUMENTED IN MEDICAL RECORD: ICD-10-PCS | Mod: CPTII,95,, | Performed by: HOSPITALIST

## 2022-08-18 PROCEDURE — 1160F RVW MEDS BY RX/DR IN RCRD: CPT | Mod: CPTII,95,, | Performed by: HOSPITALIST

## 2022-08-18 PROCEDURE — 1160F PR REVIEW ALL MEDS BY PRESCRIBER/CLIN PHARMACIST DOCUMENTED: ICD-10-PCS | Mod: CPTII,95,, | Performed by: HOSPITALIST

## 2022-08-18 RX ORDER — DEXTROAMPHETAMINE SACCHARATE, AMPHETAMINE ASPARTATE MONOHYDRATE, DEXTROAMPHETAMINE SULFATE AND AMPHETAMINE SULFATE 3.75; 3.75; 3.75; 3.75 MG/1; MG/1; MG/1; MG/1
15 CAPSULE, EXTENDED RELEASE ORAL EVERY MORNING
Qty: 30 CAPSULE | Refills: 0 | Status: SHIPPED | OUTPATIENT
Start: 2022-09-18 | End: 2022-12-28 | Stop reason: SDUPTHER

## 2022-08-18 RX ORDER — DEXTROAMPHETAMINE SACCHARATE, AMPHETAMINE ASPARTATE MONOHYDRATE, DEXTROAMPHETAMINE SULFATE AND AMPHETAMINE SULFATE 3.75; 3.75; 3.75; 3.75 MG/1; MG/1; MG/1; MG/1
15 CAPSULE, EXTENDED RELEASE ORAL EVERY MORNING
Qty: 30 CAPSULE | Refills: 0 | Status: SHIPPED | OUTPATIENT
Start: 2022-08-18 | End: 2022-12-28 | Stop reason: SDUPTHER

## 2022-08-18 RX ORDER — DEXTROAMPHETAMINE SACCHARATE, AMPHETAMINE ASPARTATE MONOHYDRATE, DEXTROAMPHETAMINE SULFATE AND AMPHETAMINE SULFATE 3.75; 3.75; 3.75; 3.75 MG/1; MG/1; MG/1; MG/1
15 CAPSULE, EXTENDED RELEASE ORAL EVERY MORNING
Qty: 30 CAPSULE | Refills: 0 | Status: SHIPPED | OUTPATIENT
Start: 2022-10-18 | End: 2022-12-28 | Stop reason: SDUPTHER

## 2022-08-18 NOTE — PROGRESS NOTES
Virtual Visit  The patient location is:    The chief complaint leading to consultation is: adhd  Visit type: Virtual visit with synchronous audio and video  Total time spent with patient: 7 min  Each patient to whom he or she provides medical services by telemedicine is:  (1) informed of the relationship between the physician and patient and the respective role of any other health care provider with respect to management of the patient; and (2) notified that he or she may decline to receive medical services by telemedicine and may withdraw from such care at any time.    Subjective:       @Patient ID: Hollis Miranda II is a 32 y.o. male.    Chief Complaint: ADHD    HPI  31 yo male with ADHD presents for f/u and med refill. Pt reports doing well with current medication. Has noticed mild decrease in appetite at lunch time. Not bothersome to him. Otherwise doing weill with medication.         Review of Systems   Constitutional: Negative for chills and fever.   HENT: Negative for congestion and sore throat.    Eyes: Negative for pain and visual disturbance.   Respiratory: Negative for cough and shortness of breath.    Cardiovascular: Negative for chest pain and leg swelling.   Gastrointestinal: Negative for abdominal pain, nausea and vomiting.   Endocrine: Negative for polydipsia and polyuria.   Genitourinary: Negative for difficulty urinating and dysuria.   Musculoskeletal: Negative for arthralgias and back pain.   Skin: Negative for rash and wound.   Neurological: Negative for weakness and headaches.   Psychiatric/Behavioral: Negative for agitation and confusion.     Past medical history, surgical history, and family medical history reviewed and updated as appropriate.    Medications and allergies reviewed.     Objective:     There were no vitals filed for this visit.  There is no height or weight on file to calculate BMI.  Physical Exam  Constitutional:       Appearance: Normal appearance.   HENT:      Head:  Normocephalic and atraumatic.   Pulmonary:      Effort: Pulmonary effort is normal.   Neurological:      Mental Status: He is alert and oriented to person, place, and time.   Psychiatric:         Mood and Affect: Mood normal.         Behavior: Behavior normal.         Lab Results   Component Value Date    WBC 5.4 08/17/2022    HGB 16.4 08/17/2022    HCT 47.9 08/17/2022     08/17/2022    CHOL 200 (H) 08/17/2022    TRIG 196 (H) 08/17/2022    HDL 33 (L) 08/17/2022    ALT 10 08/17/2022    AST 11 08/17/2022     08/17/2022    K 4.2 08/17/2022     08/17/2022    CREATININE 0.86 08/17/2022    BUN 13.0 08/17/2022    CO2 28 08/17/2022    TSH 2.67 08/17/2022       Assessment:     1. ADHD (attention deficit hyperactivity disorder), combined type    2. Hyperlipidemia, unspecified hyperlipidemia type      Plan:   Hollis was seen today for adhd.    Diagnoses and all orders for this visit:    ADHD (attention deficit hyperactivity disorder), combined type  I have reviewed the . Pt appears compliant with medication. Rx sent to pharmacy.     Hyperlipidemia, unspecified hyperlipidemia type  - reviewed lab results with pt in clinic.   - counseled on healthy diet to lower cholesterol     Other orders  -     dextroamphetamine-amphetamine (ADDERALL XR) 15 MG 24 hr capsule; Take 1 capsule (15 mg total) by mouth every morning.  -     dextroamphetamine-amphetamine (ADDERALL XR) 15 MG 24 hr capsule; Take 1 capsule (15 mg total) by mouth every morning.  -     dextroamphetamine-amphetamine (ADDERALL XR) 15 MG 24 hr capsule; Take 1 capsule (15 mg total) by mouth every morning.          rtc 3 months     Ayse Wilson MD  Internal Medicine    8/18/2022

## 2022-12-23 RX ORDER — DEXTROAMPHETAMINE SACCHARATE, AMPHETAMINE ASPARTATE MONOHYDRATE, DEXTROAMPHETAMINE SULFATE AND AMPHETAMINE SULFATE 3.75; 3.75; 3.75; 3.75 MG/1; MG/1; MG/1; MG/1
15 CAPSULE, EXTENDED RELEASE ORAL EVERY MORNING
Qty: 30 CAPSULE | Refills: 0 | Status: CANCELLED | OUTPATIENT
Start: 2022-12-23

## 2022-12-23 NOTE — TELEPHONE ENCOUNTER
No new care gaps identified.  [facility] Embedded Care Gaps. Reference number: [id number]. 12/23/2022   7:31:31 AM CST

## 2022-12-27 ENCOUNTER — PATIENT MESSAGE (OUTPATIENT)
Dept: INTERNAL MEDICINE | Facility: CLINIC | Age: 32
End: 2022-12-27
Payer: COMMERCIAL

## 2022-12-28 ENCOUNTER — OFFICE VISIT (OUTPATIENT)
Dept: INTERNAL MEDICINE | Facility: CLINIC | Age: 32
End: 2022-12-28
Payer: COMMERCIAL

## 2022-12-28 DIAGNOSIS — F90.2 ADHD (ATTENTION DEFICIT HYPERACTIVITY DISORDER), COMBINED TYPE: Primary | ICD-10-CM

## 2022-12-28 PROCEDURE — 1160F RVW MEDS BY RX/DR IN RCRD: CPT | Mod: CPTII,95,, | Performed by: HOSPITALIST

## 2022-12-28 PROCEDURE — 1159F MED LIST DOCD IN RCRD: CPT | Mod: CPTII,95,, | Performed by: HOSPITALIST

## 2022-12-28 PROCEDURE — 99213 PR OFFICE/OUTPT VISIT, EST, LEVL III, 20-29 MIN: ICD-10-PCS | Mod: 95,,, | Performed by: HOSPITALIST

## 2022-12-28 PROCEDURE — 1159F PR MEDICATION LIST DOCUMENTED IN MEDICAL RECORD: ICD-10-PCS | Mod: CPTII,95,, | Performed by: HOSPITALIST

## 2022-12-28 PROCEDURE — 1160F PR REVIEW ALL MEDS BY PRESCRIBER/CLIN PHARMACIST DOCUMENTED: ICD-10-PCS | Mod: CPTII,95,, | Performed by: HOSPITALIST

## 2022-12-28 PROCEDURE — 99213 OFFICE O/P EST LOW 20 MIN: CPT | Mod: 95,,, | Performed by: HOSPITALIST

## 2022-12-28 RX ORDER — DEXTROAMPHETAMINE SACCHARATE, AMPHETAMINE ASPARTATE MONOHYDRATE, DEXTROAMPHETAMINE SULFATE AND AMPHETAMINE SULFATE 3.75; 3.75; 3.75; 3.75 MG/1; MG/1; MG/1; MG/1
15 CAPSULE, EXTENDED RELEASE ORAL EVERY MORNING
Qty: 30 CAPSULE | Refills: 0 | Status: SHIPPED | OUTPATIENT
Start: 2023-01-28 | End: 2023-01-12 | Stop reason: SDUPTHER

## 2022-12-28 RX ORDER — DEXTROAMPHETAMINE SACCHARATE, AMPHETAMINE ASPARTATE MONOHYDRATE, DEXTROAMPHETAMINE SULFATE AND AMPHETAMINE SULFATE 3.75; 3.75; 3.75; 3.75 MG/1; MG/1; MG/1; MG/1
15 CAPSULE, EXTENDED RELEASE ORAL EVERY MORNING
Qty: 30 CAPSULE | Refills: 0 | Status: SHIPPED | OUTPATIENT
Start: 2023-02-28 | End: 2023-01-12 | Stop reason: SDUPTHER

## 2022-12-28 RX ORDER — DEXTROAMPHETAMINE SACCHARATE, AMPHETAMINE ASPARTATE MONOHYDRATE, DEXTROAMPHETAMINE SULFATE AND AMPHETAMINE SULFATE 3.75; 3.75; 3.75; 3.75 MG/1; MG/1; MG/1; MG/1
15 CAPSULE, EXTENDED RELEASE ORAL EVERY MORNING
Qty: 30 CAPSULE | Refills: 0 | Status: CANCELLED | OUTPATIENT
Start: 2022-12-28

## 2022-12-28 RX ORDER — DEXTROAMPHETAMINE SACCHARATE, AMPHETAMINE ASPARTATE MONOHYDRATE, DEXTROAMPHETAMINE SULFATE AND AMPHETAMINE SULFATE 3.75; 3.75; 3.75; 3.75 MG/1; MG/1; MG/1; MG/1
15 CAPSULE, EXTENDED RELEASE ORAL EVERY MORNING
Qty: 30 CAPSULE | Refills: 0 | Status: SHIPPED | OUTPATIENT
Start: 2022-12-28 | End: 2023-01-12 | Stop reason: SDUPTHER

## 2022-12-28 NOTE — TELEPHONE ENCOUNTER
No new care gaps identified.  [facility] Embedded Care Gaps. Reference number: [id number]. 12/28/2022   1:31:15 PM CST

## 2022-12-28 NOTE — PROGRESS NOTES
Virtual Visit  The patient location is:    The chief complaint leading to consultation is: adhd  Visit type: Virtual visit with synchronous audio and video  Total time spent with patient: 4 min  Each patient to whom he or she provides medical services by telemedicine is:  (1) informed of the relationship between the physician and patient and the respective role of any other health care provider with respect to management of the patient; and (2) notified that he or she may decline to receive medical services by telemedicine and may withdraw from such care at any time.    Subjective:       @Patient ID: Hollis Miranda II is a 32 y.o. male.    Chief Complaint: ADHD    HPI  31 yo male with ADHD presents for f/u of ADHD and med refill. Pt reports doing well with current medication.        Review of Systems   Constitutional:  Negative for chills and fever.   HENT:  Negative for congestion and sore throat.    Eyes:  Negative for pain and visual disturbance.   Respiratory:  Negative for cough and shortness of breath.    Cardiovascular:  Negative for chest pain and leg swelling.   Gastrointestinal:  Negative for abdominal pain, nausea and vomiting.   Endocrine: Negative for polydipsia and polyuria.   Genitourinary:  Negative for difficulty urinating and dysuria.   Musculoskeletal:  Negative for arthralgias and back pain.   Skin:  Negative for rash and wound.   Neurological:  Negative for weakness and headaches.   Psychiatric/Behavioral:  Negative for agitation and confusion.    Past medical history, surgical history, and family medical history reviewed and updated as appropriate.    Medications and allergies reviewed.     Objective:     There were no vitals filed for this visit.  There is no height or weight on file to calculate BMI.  Physical Exam  Constitutional:       Appearance: Normal appearance.   HENT:      Head: Normocephalic and atraumatic.   Pulmonary:      Effort: Pulmonary effort is normal.   Neurological:       Mental Status: He is alert and oriented to person, place, and time.   Psychiatric:         Mood and Affect: Mood normal.         Behavior: Behavior normal.       Lab Results   Component Value Date    WBC 5.4 08/17/2022    HGB 16.4 08/17/2022    HCT 47.9 08/17/2022     08/17/2022    CHOL 200 (H) 08/17/2022    TRIG 196 (H) 08/17/2022    HDL 33 (L) 08/17/2022    ALT 10 08/17/2022    AST 11 08/17/2022     08/17/2022    K 4.2 08/17/2022     08/17/2022    CREATININE 0.86 08/17/2022    BUN 13.0 08/17/2022    CO2 28 08/17/2022    TSH 2.67 08/17/2022       Assessment:     1. ADHD (attention deficit hyperactivity disorder), combined type      Plan:   Hollis was seen today for adhd.    Diagnoses and all orders for this visit:    ADHD (attention deficit hyperactivity disorder), combined type  I have reviewed the . Pt appears compliant with medication. Rx sent to pharmacy.        Other orders  -     dextroamphetamine-amphetamine (ADDERALL XR) 15 MG 24 hr capsule; Take 1 capsule (15 mg total) by mouth every morning.  -     dextroamphetamine-amphetamine (ADDERALL XR) 15 MG 24 hr capsule; Take 1 capsule (15 mg total) by mouth every morning.  -     dextroamphetamine-amphetamine (ADDERALL XR) 15 MG 24 hr capsule; Take 1 capsule (15 mg total) by mouth every morning.          rtc 3 months     Ayse Wilson MD  Internal Medicine    12/28/2022

## 2023-01-10 RX ORDER — DEXTROAMPHETAMINE SACCHARATE, AMPHETAMINE ASPARTATE MONOHYDRATE, DEXTROAMPHETAMINE SULFATE AND AMPHETAMINE SULFATE 3.75; 3.75; 3.75; 3.75 MG/1; MG/1; MG/1; MG/1
15 CAPSULE, EXTENDED RELEASE ORAL EVERY MORNING
Qty: 30 CAPSULE | Refills: 0 | Status: CANCELLED | OUTPATIENT
Start: 2023-02-28

## 2023-01-10 RX ORDER — DEXTROAMPHETAMINE SACCHARATE, AMPHETAMINE ASPARTATE MONOHYDRATE, DEXTROAMPHETAMINE SULFATE AND AMPHETAMINE SULFATE 3.75; 3.75; 3.75; 3.75 MG/1; MG/1; MG/1; MG/1
15 CAPSULE, EXTENDED RELEASE ORAL EVERY MORNING
Qty: 30 CAPSULE | Refills: 0 | Status: CANCELLED | OUTPATIENT
Start: 2023-01-10

## 2023-01-10 RX ORDER — DEXTROAMPHETAMINE SACCHARATE, AMPHETAMINE ASPARTATE MONOHYDRATE, DEXTROAMPHETAMINE SULFATE AND AMPHETAMINE SULFATE 3.75; 3.75; 3.75; 3.75 MG/1; MG/1; MG/1; MG/1
15 CAPSULE, EXTENDED RELEASE ORAL EVERY MORNING
Qty: 30 CAPSULE | Refills: 0 | Status: CANCELLED | OUTPATIENT
Start: 2023-01-28

## 2023-01-10 NOTE — TELEPHONE ENCOUNTER
----- Message from Sulema Goins sent at 1/10/2023  1:38 PM CST -----  Contact: pt [phone]  Requesting an RX refill or new RX.  Is this a refill or new RX: refill  RX name and strength (copy/paste from chart):  dextroamphetamine-amphetamine (ADDERALL XR) 15 MG 24 hr capsule  Is this a 30 day or 90 day RX: 30  Pharmacy name and phone # (copy/paste from chart):   [facility]  [address]  [place] [address]  Phone: [phone] Fax: [phone]      The doctors have asked that we provide their patients with the following 2 reminders -- prescription refills can take up to 72 hours, and a friendly reminder that in the future you can use your [facility] account to request refills: yes

## 2023-01-10 NOTE — TELEPHONE ENCOUNTER
No new care gaps identified.  [facility] Embedded Care Gaps. Reference number: [id number]. 1/10/2023   4:15:14 PM CST

## 2023-01-12 NOTE — TELEPHONE ENCOUNTER
No new care gaps identified.  [facility] Embedded Care Gaps. Reference number: [id number]. 1/12/2023   12:34:56 PM CST

## 2023-01-13 RX ORDER — DEXTROAMPHETAMINE SACCHARATE, AMPHETAMINE ASPARTATE MONOHYDRATE, DEXTROAMPHETAMINE SULFATE AND AMPHETAMINE SULFATE 3.75; 3.75; 3.75; 3.75 MG/1; MG/1; MG/1; MG/1
15 CAPSULE, EXTENDED RELEASE ORAL EVERY MORNING
Qty: 30 CAPSULE | Refills: 0 | Status: SHIPPED | OUTPATIENT
Start: 2023-01-13

## 2023-01-13 RX ORDER — DEXTROAMPHETAMINE SACCHARATE, AMPHETAMINE ASPARTATE MONOHYDRATE, DEXTROAMPHETAMINE SULFATE AND AMPHETAMINE SULFATE 3.75; 3.75; 3.75; 3.75 MG/1; MG/1; MG/1; MG/1
15 CAPSULE, EXTENDED RELEASE ORAL EVERY MORNING
Qty: 30 CAPSULE | Refills: 0 | Status: SHIPPED | OUTPATIENT
Start: 2023-03-13

## 2023-01-13 RX ORDER — DEXTROAMPHETAMINE SACCHARATE, AMPHETAMINE ASPARTATE MONOHYDRATE, DEXTROAMPHETAMINE SULFATE AND AMPHETAMINE SULFATE 3.75; 3.75; 3.75; 3.75 MG/1; MG/1; MG/1; MG/1
15 CAPSULE, EXTENDED RELEASE ORAL EVERY MORNING
Qty: 30 CAPSULE | Refills: 0 | Status: SHIPPED | OUTPATIENT
Start: 2023-02-13

## 2023-05-22 RX ORDER — DEXTROAMPHETAMINE SACCHARATE, AMPHETAMINE ASPARTATE MONOHYDRATE, DEXTROAMPHETAMINE SULFATE AND AMPHETAMINE SULFATE 3.75; 3.75; 3.75; 3.75 MG/1; MG/1; MG/1; MG/1
15 CAPSULE, EXTENDED RELEASE ORAL EVERY MORNING
Qty: 30 CAPSULE | Refills: 0 | OUTPATIENT
Start: 2023-05-22

## 2023-05-22 NOTE — TELEPHONE ENCOUNTER
No care due was identified.  [facility] Embedded Care Due Messages. Reference number: [id number].   5/22/2023 7:32:40 AM CDT

## 2023-06-05 RX ORDER — DEXTROAMPHETAMINE SACCHARATE, AMPHETAMINE ASPARTATE MONOHYDRATE, DEXTROAMPHETAMINE SULFATE AND AMPHETAMINE SULFATE 3.75; 3.75; 3.75; 3.75 MG/1; MG/1; MG/1; MG/1
15 CAPSULE, EXTENDED RELEASE ORAL EVERY MORNING
Qty: 30 CAPSULE | Refills: 0 | OUTPATIENT
Start: 2023-06-05

## 2023-06-05 NOTE — TELEPHONE ENCOUNTER
No care due was identified.  [facility] Embedded Care Due Messages. Reference number: [id number].   6/05/2023 7:40:05 AM CDT

## 2023-07-31 ENCOUNTER — OFFICE VISIT (OUTPATIENT)
Dept: OPTOMETRY | Facility: CLINIC | Age: 33
End: 2023-07-31
Payer: COMMERCIAL

## 2023-07-31 DIAGNOSIS — H52.13 MYOPIC ASTIGMATISM OF BOTH EYES: ICD-10-CM

## 2023-07-31 DIAGNOSIS — H52.203 MYOPIC ASTIGMATISM OF BOTH EYES: ICD-10-CM

## 2023-07-31 DIAGNOSIS — Z97.3 WEARS CONTACT LENSES: ICD-10-CM

## 2023-07-31 DIAGNOSIS — Z01.00 ROUTINE EYE EXAM: Primary | ICD-10-CM

## 2023-07-31 DIAGNOSIS — H04.123 DRY EYE SYNDROME OF BOTH EYES: ICD-10-CM

## 2023-07-31 PROCEDURE — 92310 CONTACT LENS FITTING OU: CPT | Mod: CSM,S$GLB,, | Performed by: OPTOMETRIST

## 2023-07-31 PROCEDURE — 92014 COMPRE OPH EXAM EST PT 1/>: CPT | Mod: ,,, | Performed by: OPTOMETRIST

## 2023-07-31 PROCEDURE — 92310 PR CONTACT LENS FITTING (NO CHANGE): ICD-10-PCS | Mod: CSM,S$GLB,, | Performed by: OPTOMETRIST

## 2023-07-31 PROCEDURE — 1159F MED LIST DOCD IN RCRD: CPT | Mod: CPTII,,, | Performed by: OPTOMETRIST

## 2023-07-31 PROCEDURE — 92015 DETERMINE REFRACTIVE STATE: CPT | Mod: ,,, | Performed by: OPTOMETRIST

## 2023-07-31 PROCEDURE — 1159F PR MEDICATION LIST DOCUMENTED IN MEDICAL RECORD: ICD-10-PCS | Mod: CPTII,,, | Performed by: OPTOMETRIST

## 2023-07-31 PROCEDURE — 99999 PR PBB SHADOW E&M-EST. PATIENT-LVL I: ICD-10-PCS | Mod: PBBFAC,,, | Performed by: OPTOMETRIST

## 2023-07-31 PROCEDURE — 92014 PR EYE EXAM, EST PATIENT,COMPREHESV: ICD-10-PCS | Mod: ,,, | Performed by: OPTOMETRIST

## 2023-07-31 PROCEDURE — 99999 PR PBB SHADOW E&M-EST. PATIENT-LVL I: CPT | Mod: PBBFAC,,, | Performed by: OPTOMETRIST

## 2023-07-31 PROCEDURE — 92015 PR REFRACTION: ICD-10-PCS | Mod: ,,, | Performed by: OPTOMETRIST

## 2023-08-01 NOTE — PROGRESS NOTES
HPI    Annual eye exam. Pt ripped last OS lens so does not have a CL for that   eye.   ODILON: 07/10/20    32 y.o. is here for annual eye exam  Pt states vision is the same as previous exam  Pt wears cl most of the time  Pt wants to get a new rx for cl    (-) Changes in vision   (-) Pain  (-) Irritation   (-) Itching   (-) Flashes  (-) Floaters  (+) Glasses wearer  (+) CL wearer  (-) Uses eye gtts    Does patient want a refraction today? yes    (-) Eye injury  (-) Eye surgery   (-)POHx  (-)FOHx    (-)DM    Last edited by Yaneth Sotelo, OD on 8/1/2023  2:15 PM.            Assessment /Plan     For exam results, see Encounter Report.    Routine eye exam    Myopic astigmatism of both eyes    Wears contact lenses    Dry eye syndrome of both eyes      1-2. Updated SRx. No change from habitual. Monitor yearly.      3. Updated CL Rx. No change from habitual. Reviewed proper CL care and hygiene. Monitor yearly.      4. Educated pt on findings. Recommended ATs BID-TID for added lubrication and comfort. If symptoms worsen or dont improve, RTC. Monitor.        RTC in 1 year for annual eye exam or sooner if needed.

## 2023-08-16 ENCOUNTER — OFFICE VISIT (OUTPATIENT)
Dept: URGENT CARE | Facility: CLINIC | Age: 33
End: 2023-08-16
Payer: COMMERCIAL

## 2023-08-16 VITALS
OXYGEN SATURATION: 97 % | TEMPERATURE: 98 F | BODY MASS INDEX: 25.91 KG/M2 | SYSTOLIC BLOOD PRESSURE: 137 MMHG | HEART RATE: 108 BPM | RESPIRATION RATE: 16 BRPM | WEIGHT: 181 LBS | DIASTOLIC BLOOD PRESSURE: 93 MMHG | HEIGHT: 70 IN

## 2023-08-16 DIAGNOSIS — M25.571 ACUTE RIGHT ANKLE PAIN: ICD-10-CM

## 2023-08-16 DIAGNOSIS — M25.571 ACUTE RIGHT ANKLE PAIN: Primary | ICD-10-CM

## 2023-08-16 PROCEDURE — 99213 PR OFFICE/OUTPT VISIT, EST, LEVL III, 20-29 MIN: ICD-10-PCS | Mod: S$GLB,,, | Performed by: NURSE PRACTITIONER

## 2023-08-16 PROCEDURE — 73610 XR ANKLE COMPLETE 3 VIEW RIGHT: ICD-10-PCS | Mod: FY,RT,S$GLB, | Performed by: RADIOLOGY

## 2023-08-16 PROCEDURE — 99213 OFFICE O/P EST LOW 20 MIN: CPT | Mod: S$GLB,,, | Performed by: NURSE PRACTITIONER

## 2023-08-16 PROCEDURE — 73610 X-RAY EXAM OF ANKLE: CPT | Mod: FY,RT,S$GLB, | Performed by: RADIOLOGY

## 2023-08-16 RX ORDER — ACETAMINOPHEN AND CODEINE PHOSPHATE 300; 60 MG/1; MG/1
1 TABLET ORAL EVERY 8 HOURS PRN
Qty: 21 TABLET | Refills: 0 | Status: SHIPPED | OUTPATIENT
Start: 2023-08-16 | End: 2023-08-23

## 2023-08-16 RX ORDER — ACETAMINOPHEN AND CODEINE PHOSPHATE 300; 60 MG/1; MG/1
1 TABLET ORAL EVERY 8 HOURS PRN
Qty: 21 TABLET | Refills: 0 | Status: SHIPPED | OUTPATIENT
Start: 2023-08-16 | End: 2023-08-16 | Stop reason: SDUPTHER

## 2023-08-16 NOTE — PROGRESS NOTES
"Subjective:      Patient ID: Hollis Miranda II is a 33 y.o. male.    Vitals:  height is 5' 10" (1.778 m) and weight is 82.1 kg (181 lb). His oral temperature is 98.2 °F (36.8 °C). His blood pressure is 137/93 (abnormal) and his pulse is 108. His respiration is 16 and oxygen saturation is 97%.     Chief Complaint: Ankle Injury (RIGHT ankle injured from fall)    Pt is coming in today for a chief complaint of R ankle injury that occurred yesterday evening while he was walking downstairs tripped and fell twisting his ankle. Pt has been taking ibuprofen/applying cold compress/ using walking boot since then. Noticeable swelling and bruising on R ankle.    Ankle Injury   The incident occurred 12 to 24 hours ago. The injury mechanism was a fall. The pain is present in the right ankle. The quality of the pain is described as stabbing. The pain is at a severity of 8/10. The pain is moderate. The pain has been Constant since onset. Associated symptoms include an inability to bear weight. He reports no foreign bodies present. The symptoms are aggravated by palpation, weight bearing and movement. He has tried NSAIDs, ice, immobilization, non-weight bearing and elevation for the symptoms. The treatment provided no relief.       Constitution: Negative.   Cardiovascular: Negative.    Respiratory: Negative.     Musculoskeletal:  Positive for pain, trauma, joint pain, joint swelling and pain with walking.   Skin:  Positive for erythema.      Objective:     Physical Exam   Pulmonary/Chest: Effort normal.   Abdominal: Normal appearance.   Musculoskeletal:         General: Swelling, tenderness and signs of injury present. No deformity.      Right lower leg: Edema present.   Neurological: He is alert.   Skin: bruising and erythema       Assessment:     1. Right ankle pain, unspecified chronicity        Plan:   Mr. Miranda slipped down stairs while caring objects. States he fell down 2 steps when injuring his R foot. Has experience " pain, swelling and brusing to R foot/ankle. Has been wearing a walking boot he was given which has help relieve some pain. Also states when at home he does applies ice to his foot.     Right ankle pain, unspecified chronicity  -     XR ANKLE COMPLETE 3 VIEW RIGHT; Future; Expected date: 08/16/2023      Patient Instructions   Instruct on RICE  Instruct on medication  Referral to Orthopedic

## 2023-08-16 NOTE — PROGRESS NOTES
Subjective:      Patient ID: Hollis Miranda II is a 33 y.o. male.    Vitals:  vitals were not taken for this visit.     Chief Complaint: Ankle Injury    Ankle Injury   The pain is present in the right ankle. He has tried immobilization for the symptoms.   ROS   Objective:     Physical Exam    Assessment:     No diagnosis found.    Plan:       There are no diagnoses linked to this encounter.

## 2023-08-18 ENCOUNTER — HOSPITAL ENCOUNTER (OUTPATIENT)
Dept: RADIOLOGY | Facility: HOSPITAL | Age: 33
Discharge: HOME | End: 2023-08-18
Attending: PHYSICIAN ASSISTANT
Payer: COMMERCIAL

## 2023-08-18 ENCOUNTER — OFFICE VISIT (OUTPATIENT)
Dept: ORTHOPEDICS | Facility: CLINIC | Age: 33
End: 2023-08-18
Payer: COMMERCIAL

## 2023-08-18 VITALS — HEIGHT: 70 IN | WEIGHT: 181 LBS | BODY MASS INDEX: 25.91 KG/M2

## 2023-08-18 DIAGNOSIS — S82.61XA DISPLACED FRACTURE OF LATERAL MALLEOLUS OF RIGHT FIBULA, INITIAL ENCOUNTER FOR CLOSED FRACTURE: Primary | ICD-10-CM

## 2023-08-18 DIAGNOSIS — M25.571 ACUTE RIGHT ANKLE PAIN: ICD-10-CM

## 2023-08-18 PROCEDURE — 3008F PR BODY MASS INDEX (BMI) DOCUMENTED: ICD-10-PCS | Mod: CPTII,S$GLB,, | Performed by: PHYSICIAN ASSISTANT

## 2023-08-18 PROCEDURE — 99213 PR OFFICE/OUTPT VISIT, EST, LEVL III, 20-29 MIN: ICD-10-PCS | Mod: S$GLB,,, | Performed by: PHYSICIAN ASSISTANT

## 2023-08-18 PROCEDURE — 3008F BODY MASS INDEX DOCD: CPT | Mod: CPTII,S$GLB,, | Performed by: PHYSICIAN ASSISTANT

## 2023-08-18 PROCEDURE — 73610 X-RAY EXAM OF ANKLE: CPT | Mod: 26,RT,, | Performed by: RADIOLOGY

## 2023-08-18 PROCEDURE — 73610 X-RAY EXAM OF ANKLE: CPT | Mod: TC,RT

## 2023-08-18 PROCEDURE — 99999 PR PBB SHADOW E&M-EST. PATIENT-LVL III: ICD-10-PCS | Mod: PBBFAC,,, | Performed by: PHYSICIAN ASSISTANT

## 2023-08-18 PROCEDURE — 99999 PR PBB SHADOW E&M-EST. PATIENT-LVL III: CPT | Mod: PBBFAC,,, | Performed by: PHYSICIAN ASSISTANT

## 2023-08-18 PROCEDURE — 99213 OFFICE O/P EST LOW 20 MIN: CPT | Mod: S$GLB,,, | Performed by: PHYSICIAN ASSISTANT

## 2023-08-18 PROCEDURE — 1159F PR MEDICATION LIST DOCUMENTED IN MEDICAL RECORD: ICD-10-PCS | Mod: CPTII,S$GLB,, | Performed by: PHYSICIAN ASSISTANT

## 2023-08-18 PROCEDURE — 73610 XR ANKLE COMPLETE 3 VIEW RIGHT: ICD-10-PCS | Mod: 26,RT,, | Performed by: RADIOLOGY

## 2023-08-18 PROCEDURE — 1159F MED LIST DOCD IN RCRD: CPT | Mod: CPTII,S$GLB,, | Performed by: PHYSICIAN ASSISTANT

## 2023-08-18 RX ORDER — MELOXICAM 15 MG/1
15 TABLET ORAL DAILY
Qty: 30 TABLET | Refills: 0 | Status: SHIPPED | OUTPATIENT
Start: 2023-08-18 | End: 2023-09-17

## 2023-08-18 NOTE — PROGRESS NOTES
Subjective:     Patient ID: Hollis Miranda II is a 33 y.o. male.    Chief Complaint: No chief complaint on file.    HPI    Patient is a 33 year old male who presented to urgent care on 08/13/23 with right ankle pain that occurred the day prior after slipping on his kids play wig comming down his stairs.   RADS: acute nondisplaced extra-articular fracture of the right distal fibula  Patient has been in a boot. He is having increased pain with weightbearing. He has been icing elevating and using NSAID for pain.     Review of Systems   Constitutional: Negative for chills and fever.   Cardiovascular:  Negative for chest pain.   Respiratory:  Negative for cough and shortness of breath.    Skin:  Negative for color change, dry skin, itching, nail changes, poor wound healing and rash.   Musculoskeletal:         Right ankle fracture    Neurological:  Negative for dizziness.   Psychiatric/Behavioral:  Negative for altered mental status. The patient is not nervous/anxious.    All other systems reviewed and are negative.      Objective:       General    Constitutional: He is oriented to person, place, and time. He appears well-developed and well-nourished. No distress.   HENT:   Head: Atraumatic.   Eyes: Conjunctivae are normal.   Cardiovascular:  Normal rate.            Pulmonary/Chest: Effort normal.   Neurological: He is alert and oriented to person, place, and time.   Psychiatric: He has a normal mood and affect. His behavior is normal.         Right Ankle/Foot Exam     Tenderness   The patient is tender to palpation of the lateral malleolus.    Range of Motion   The patient has normal right ankle ROM.    Other   Sensation: normal    Comments:  Mild swelling to lateral malleolus no medial tenderness.           Physical Exam  Constitutional:       General: He is not in acute distress.     Appearance: He is well-developed and well-nourished. He is not diaphoretic.   HENT:      Head: Atraumatic.   Eyes:       Conjunctiva/sclera: Conjunctivae normal.   Cardiovascular:      Rate and Rhythm: Normal rate.   Pulmonary:      Effort: Pulmonary effort is normal.   Musculoskeletal:      Right ankle: Tenderness present over the lateral malleolus.   Neurological:      Mental Status: He is alert and oriented to person, place, and time.   Psychiatric:         Mood and Affect: Mood and affect normal.         Behavior: Behavior normal.     RADS: reviewed by myself:  Distal fibula again shows a recent fracture without significant displacement located just above the level of the ankle joint.  No new fracture detected.  Ankle joint space looks normal.  Soft tissue swelling persists.    Assessment:     Encounter Diagnosis   Name Primary?    Displaced fracture of lateral malleolus of right fibula, initial encounter for closed fracture Yes       Plan:   Discussed plan with the patient. At this time plan is for conservative treatment. Will treat in a tall CAM boot, weight bearing as tolerated in the boot. Ok to remove for hygiene and gentle range of motion. Weight bearing as tolerated in boot. RICE to reduce pain and swelling. Multimodal pain control. Return to clinic in 3 weeks at that time x-ray of his ankle is needed weight bear OOB.

## 2023-09-05 ENCOUNTER — HOSPITAL ENCOUNTER (OUTPATIENT)
Dept: RADIOLOGY | Facility: HOSPITAL | Age: 33
Discharge: HOME | End: 2023-09-05
Attending: PHYSICIAN ASSISTANT
Payer: COMMERCIAL

## 2023-09-05 ENCOUNTER — OFFICE VISIT (OUTPATIENT)
Dept: ORTHOPEDICS | Facility: CLINIC | Age: 33
End: 2023-09-05
Payer: COMMERCIAL

## 2023-09-05 DIAGNOSIS — M25.571 ACUTE RIGHT ANKLE PAIN: ICD-10-CM

## 2023-09-05 DIAGNOSIS — S82.61XD CLOSED DISP FRACTURE OF RIGHT LATERAL MALLEOLUS WITH ROUTINE HEALING: ICD-10-CM

## 2023-09-05 DIAGNOSIS — M25.571 ACUTE RIGHT ANKLE PAIN: Primary | ICD-10-CM

## 2023-09-05 PROCEDURE — 99213 OFFICE O/P EST LOW 20 MIN: CPT | Mod: S$GLB,,, | Performed by: PHYSICIAN ASSISTANT

## 2023-09-05 PROCEDURE — 73610 X-RAY EXAM OF ANKLE: CPT | Mod: TC,RT

## 2023-09-05 PROCEDURE — 99999 PR PBB SHADOW E&M-EST. PATIENT-LVL I: ICD-10-PCS | Mod: PBBFAC,,, | Performed by: PHYSICIAN ASSISTANT

## 2023-09-05 PROCEDURE — 73610 XR ANKLE COMPLETE 3 VIEW RIGHT: ICD-10-PCS | Mod: 26,RT,, | Performed by: RADIOLOGY

## 2023-09-05 PROCEDURE — 73610 X-RAY EXAM OF ANKLE: CPT | Mod: 26,RT,, | Performed by: RADIOLOGY

## 2023-09-05 PROCEDURE — 99213 PR OFFICE/OUTPT VISIT, EST, LEVL III, 20-29 MIN: ICD-10-PCS | Mod: S$GLB,,, | Performed by: PHYSICIAN ASSISTANT

## 2023-09-05 PROCEDURE — 99999 PR PBB SHADOW E&M-EST. PATIENT-LVL I: CPT | Mod: PBBFAC,,, | Performed by: PHYSICIAN ASSISTANT

## 2023-09-06 NOTE — PROGRESS NOTES
Subjective:     Patient ID: Hollis Miranda II is a 33 y.o. male.    Chief Complaint: No chief complaint on file.    HPI    Patient is a 33 year old male who presented to urgent care on 08/13/23 with right ankle pain that occurred the day prior after slipping on his kids play wig comming down his stairs.   RADS: acute nondisplaced extra-articular fracture of the right distal fibula  Patient has been in a boot. He is having increased pain with weightbearing. He has been icing elevating and using NSAID for pain.     09/05/23: Patient stated that he is doing better. He has been in the boot with ambulation. He is icing and elevating as needed.     Review of Systems   Constitutional: Negative for chills and fever.   Cardiovascular:  Negative for chest pain.   Respiratory:  Negative for cough and shortness of breath.    Skin:  Negative for color change, dry skin, itching, nail changes, poor wound healing and rash.   Musculoskeletal:         Right ankle fracture    Neurological:  Negative for dizziness.   Psychiatric/Behavioral:  Negative for altered mental status. The patient is not nervous/anxious.    All other systems reviewed and are negative.      Objective:       General    Constitutional: He is oriented to person, place, and time. He appears well-developed and well-nourished. No distress.   HENT:   Head: Atraumatic.   Eyes: Conjunctivae are normal.   Cardiovascular:  Normal rate.            Pulmonary/Chest: Effort normal.   Neurological: He is alert and oriented to person, place, and time.   Psychiatric: He has a normal mood and affect. His behavior is normal.         Right Ankle/Foot Exam     Tenderness   The patient is tender to palpation of the lateral malleolus.    Range of Motion   The patient has normal right ankle ROM.    Other   Sensation: normal    Comments:  Mild swelling to lateral malleolus no medial tenderness.           Physical Exam  Constitutional:       General: He is not in acute distress.      Appearance: He is well-developed and well-nourished. He is not diaphoretic.   HENT:      Head: Atraumatic.   Eyes:      Conjunctiva/sclera: Conjunctivae normal.   Cardiovascular:      Rate and Rhythm: Normal rate.   Pulmonary:      Effort: Pulmonary effort is normal.   Musculoskeletal:      Right ankle: Tenderness present over the lateral malleolus.   Neurological:      Mental Status: He is alert and oriented to person, place, and time.   Psychiatric:         Mood and Affect: Mood and affect normal.         Behavior: Behavior normal.     RADS: reviewed by myself:  Reconfirmed subacute oblique nondisplaced distal fibula diametaphyseal fracture.  No new fractures.  Preserved tibiotalar articulation and talar dome.  Bimalleolar soft tissue swelling, greater laterally.  Tiny spurs developing at are Achilles tendon less so plantar aponeurosis attachments to calcaneus.  Minimal soft tissue swelling dorsally at the level of the visualized metatarsals    Assessment:     Encounter Diagnoses   Name Primary?    Acute right ankle pain Yes    Closed disp fracture of right lateral malleolus with routine healing        Plan:   Discussed plan with the patient. At this time plan is for conservative treatment. Will treat in a tall CAM boot, weight bearing as tolerated in the boot. Ok to remove for hygiene and gentle range of motion. Weight bearing as tolerated in boot. RICE to reduce pain and swelling. Multimodal pain control. Return to clinic in 3 weeks at that time x-ray of his ankle is needed weight bear OOB. Consider wean boot and begin PT if needed.

## 2023-09-26 ENCOUNTER — OFFICE VISIT (OUTPATIENT)
Dept: ORTHOPEDICS | Facility: CLINIC | Age: 33
End: 2023-09-26
Payer: COMMERCIAL

## 2023-09-26 ENCOUNTER — HOSPITAL ENCOUNTER (OUTPATIENT)
Dept: RADIOLOGY | Facility: HOSPITAL | Age: 33
Discharge: HOME | End: 2023-09-26
Attending: PHYSICIAN ASSISTANT
Payer: COMMERCIAL

## 2023-09-26 VITALS — WEIGHT: 181 LBS | BODY MASS INDEX: 25.91 KG/M2 | HEIGHT: 70 IN

## 2023-09-26 DIAGNOSIS — S82.61XD CLOSED DISP FRACTURE OF RIGHT LATERAL MALLEOLUS WITH ROUTINE HEALING: Primary | ICD-10-CM

## 2023-09-26 DIAGNOSIS — S82.61XD CLOSED DISP FRACTURE OF RIGHT LATERAL MALLEOLUS WITH ROUTINE HEALING: ICD-10-CM

## 2023-09-26 PROCEDURE — 73610 X-RAY EXAM OF ANKLE: CPT | Mod: 26,RT,, | Performed by: RADIOLOGY

## 2023-09-26 PROCEDURE — 99999 PR PBB SHADOW E&M-EST. PATIENT-LVL III: CPT | Mod: PBBFAC,,, | Performed by: PHYSICIAN ASSISTANT

## 2023-09-26 PROCEDURE — 73610 X-RAY EXAM OF ANKLE: CPT | Mod: TC,RT

## 2023-09-26 PROCEDURE — 99213 OFFICE O/P EST LOW 20 MIN: CPT | Mod: S$GLB,,, | Performed by: PHYSICIAN ASSISTANT

## 2023-09-26 PROCEDURE — 99213 PR OFFICE/OUTPT VISIT, EST, LEVL III, 20-29 MIN: ICD-10-PCS | Mod: S$GLB,,, | Performed by: PHYSICIAN ASSISTANT

## 2023-09-26 PROCEDURE — 99999 PR PBB SHADOW E&M-EST. PATIENT-LVL III: ICD-10-PCS | Mod: PBBFAC,,, | Performed by: PHYSICIAN ASSISTANT

## 2023-09-26 PROCEDURE — 1159F PR MEDICATION LIST DOCUMENTED IN MEDICAL RECORD: ICD-10-PCS | Mod: CPTII,S$GLB,, | Performed by: PHYSICIAN ASSISTANT

## 2023-09-26 PROCEDURE — 3008F BODY MASS INDEX DOCD: CPT | Mod: CPTII,S$GLB,, | Performed by: PHYSICIAN ASSISTANT

## 2023-09-26 PROCEDURE — 73610 XR ANKLE COMPLETE 3 VIEW RIGHT: ICD-10-PCS | Mod: 26,RT,, | Performed by: RADIOLOGY

## 2023-09-26 PROCEDURE — 3008F PR BODY MASS INDEX (BMI) DOCUMENTED: ICD-10-PCS | Mod: CPTII,S$GLB,, | Performed by: PHYSICIAN ASSISTANT

## 2023-09-26 PROCEDURE — 1159F MED LIST DOCD IN RCRD: CPT | Mod: CPTII,S$GLB,, | Performed by: PHYSICIAN ASSISTANT

## 2023-09-26 NOTE — PROGRESS NOTES
Subjective:     Patient ID: Hollis Miranda II is a 33 y.o. male.    Chief Complaint: No chief complaint on file.    HPI    Patient is a 33 year old male who presented to urgent care on 08/13/23 with right ankle pain that occurred the day prior after slipping on his kids play wig comming down his stairs.   RADS: acute nondisplaced extra-articular fracture of the right distal fibula  Patient has been in a boot. He is having increased pain with weightbearing. He has been icing elevating and using NSAID for pain.     09/05/23: Patient stated that he is doing better. He has been in the boot with ambulation. He is icing and elevating as needed.     09/26/23: Over all doing well. He stated that he does still have some swelling He tried a few times out the boot and the ankle tends to want to invert. He is not taking any pain medication.     Review of Systems   Constitutional: Negative for chills and fever.   Cardiovascular:  Negative for chest pain.   Respiratory:  Negative for cough and shortness of breath.    Skin:  Negative for color change, dry skin, itching, nail changes, poor wound healing and rash.   Musculoskeletal:         Right ankle fracture    Neurological:  Negative for dizziness.   Psychiatric/Behavioral:  Negative for altered mental status. The patient is not nervous/anxious.    All other systems reviewed and are negative.      Objective:       General    Constitutional: He is oriented to person, place, and time. He appears well-developed and well-nourished. No distress.   HENT:   Head: Atraumatic.   Eyes: Conjunctivae are normal.   Cardiovascular:  Normal rate.            Pulmonary/Chest: Effort normal.   Neurological: He is alert and oriented to person, place, and time.   Psychiatric: He has a normal mood and affect. His behavior is normal.         Right Ankle/Foot Exam     Range of Motion   The patient has normal right ankle ROM.    Other   Sensation: normal    Comments:  No TTP full range of motion            Physical Exam  Constitutional:       General: He is not in acute distress.     Appearance: He is well-developed and well-nourished. He is not diaphoretic.   HENT:      Head: Atraumatic.   Eyes:      Conjunctiva/sclera: Conjunctivae normal.   Cardiovascular:      Rate and Rhythm: Normal rate.   Pulmonary:      Effort: Pulmonary effort is normal.   Musculoskeletal:      Right ankle: Tenderness present.   Neurological:      Mental Status: He is alert and oriented to person, place, and time.   Psychiatric:         Mood and Affect: Mood and affect normal.         Behavior: Behavior normal.     RADS: reviewed by myself:  Reconfirmed subacute oblique nondisplaced distal fibula diametaphyseal fracture.  No new fractures.  Preserved tibiotalar articulation and talar dome.  Bimalleolar soft tissue swelling, greater laterally.  Tiny spurs developing at are Achilles tendon less so plantar aponeurosis attachments to calcaneus.  Minimal soft tissue swelling dorsally at the level of the visualized metatarsals    Assessment:     Encounter Diagnosis   Name Primary?    Closed disp fracture of right lateral malleolus with routine healing Yes       Plan:   Discussed plan with the patient. At this time plan is for conservative treatment. Weight bearing as tolerated, ROMAT, ok to wean boot  Ankle brace as needed. ORdered PT. RICE to reduce pain and swelling. Multimodal pain control. Return to clinic in 6 weeks at that time x-ray of his ankle is needed weight bear OOB.

## 2023-10-27 ENCOUNTER — CLINICAL SUPPORT (OUTPATIENT)
Dept: REHABILITATION | Facility: HOSPITAL | Age: 33
End: 2023-10-27
Payer: COMMERCIAL

## 2023-10-27 DIAGNOSIS — R53.1 DECREASED STRENGTH: ICD-10-CM

## 2023-10-27 DIAGNOSIS — S82.61XD CLOSED DISP FRACTURE OF RIGHT LATERAL MALLEOLUS WITH ROUTINE HEALING: ICD-10-CM

## 2023-10-27 DIAGNOSIS — M25.671 DECREASED RANGE OF MOTION OF RIGHT ANKLE: ICD-10-CM

## 2023-10-27 PROBLEM — M25.673 DECREASED ROM OF ANKLE: Status: ACTIVE | Noted: 2023-10-27

## 2023-10-27 PROCEDURE — 97530 THERAPEUTIC ACTIVITIES: CPT | Mod: PN

## 2023-10-27 PROCEDURE — 97162 PT EVAL MOD COMPLEX 30 MIN: CPT | Mod: PN

## 2023-10-27 NOTE — PLAN OF CARE
[facility]   Physical Therapy Initial Evaluation        Date: 10/27/2023   Name: Hollis Miranda   Clinic Number: [id number]    Therapy Diagnosis:   Encounter Diagnoses   Name Primary?    Closed disp fracture of right lateral malleolus with routine healing     Decreased range of motion of right ankle     Decreased strength      Physician: Jody Natarajan PA-C    Physician Orders: PT Eval and Treat   Medical Diagnosis from Referral: S82.61XD (ICD-10-CM) - Closed disp fracture of right lateral malleolus with routine healing  Evaluation Date: 10/27/2023  Authorization Period Expiration: 10/27/2024  Plan of Care Expiration: 11/28/2023  Progress Note Due: 11/15/2023  Visit # / Visits authorized: 1/ 20   FOTO: 1/5    Precautions: Standard     Time In: 8:05 am   Time Out: 9:00 am   Total Billable Time: 55 minutes    Subjective     Date of onset: 8/13/2023    History of current condition - Hollis presented to urgent care on 08/13/23 with right ankle pain that occurred the day prior after slipping on his kids play wig comming down his stairs. Wore the boot since then up until two weeks ago. Currently, just wearing ankle brace daily. Tried walking around yesterday without brace and could feel some soreness. Alternates between tennis shoes and work shoes. Pain with extended periods of walking. Denies feelings of instability.     Falls: none    Imaging: XRAY: Reconfirmed subacute oblique fracture involving the distal fibula diametaphyseal region.  On the lateral exam, components of fracture line would appear to still be present.  No new fractures.  Preserved tibiotalar articulation and talar dome.  Bimalleolar soft tissue swelling, greater laterally.  Calcaneal spurring at Achilles tendon less so plantar fascia attachments.  Minimal soft tissue swelling dorsally at the visualized proximal metatarsals.    Prior Therapy: none   Social History:  lives with their family. Master bedroom on second  floor - no issues with reciprocal stair negotiations   Occupation:    Prior Level of Function: independent   Current Level of Function: unable to participate in sand volleyball league; was able to golf last week     Pain:  Current 0/10, worst 3/10, best 0/10   Location: right ankles   Description: Aching and Dull  Aggravating Factors: Walking  Easing Factors: ankle brace     Patients goals: be able to play sand volleyball, jump, and run; go skiing in February      Medical History:   Past Medical History:   Diagnosis Date    ADHD (attention deficit hyperactivity disorder)        Surgical History:   Hollis Miranda II  has no past surgical history on file.    Medications:   Hollis has a current medication list which includes the following prescription(s): dextroamphetamine-amphetamine, dextroamphetamine-amphetamine, and dextroamphetamine-amphetamine.    Allergies:   Review of patient's allergies indicates:  No Known Allergies     Objective      Palpation: (+) distal 1/3 of fibula     Posture: right calcaneus valgus     Gross Movement Analysis:  - Gait:  mild deficit in push-off   - Lateral Step Down: 4x (lateral ankle pinching) and valgus collapse       Ankle  Left  Right Pain/Dysfunction with Movement    AROM PROM AROM PROM WNL   Great toe (45/70 deg) NT NT NT NT    Plantarflexion (50 deg) 33 NT 41 49    Dorsiflexion (20 deg) 17 NT 8 13    Inversion (20-30 deg) 28 NT 21 30    Eversion (5-10 deg) 30 NT 20 30        Lower Extremity Strength                 LE           Right           Left   Hip flexion: 4+/5 4+/5   Knee flexion 5/5 5/5   Knee extension 5/5 5/5   Dorsiflexion 5/5 5/5   Plantarflexion 5/5 5/5   Inversion 4+/5* 4+/5   Eversion  4+/5 4+/5   Great Toe 5/5 5/5     Special Tests:                             Right           Left   Anterior Drawer - NT   Eversion Stress Test - NT   repeated heel raise 7x (! lateral ankle) 20x     Flexibility:    Hamstring 90/90: limited  "   Gastroc-soleus complex: limited     Joint Mobility: talocrural: hypomobile       Intake Outcome Measure for FOTO ankle Survey    Therapist reviewed FOTO scores for Hollis Miranda II on 10/27/2023.   FOTO report - see Media section or FOTO account episode details.    Intake Score: 35%         Treatment     Total Treatment time (time-based codes) separate from Evaluation: 15 minutes     Hollis received the treatments listed below:        therapeutic activities to improve functional performance for 15  minutes, includin-way ankle: blue PF and DF and green INV/EVR - 10x   Heel raises double leg: 10x   Gastroc stretch with towel 30"  Soleus stretch with towel: 30"    Patient Education and Home Exercises     Education provided:   - Findings; prognosis and plan of care  - Home exercise program  - Modality options  - Therapist contact information      Written Home Exercises Provided: yes. Exercises were reviewed and Hollis was able to demonstrate them prior to the end of the session.  Hollis demonstrated good  understanding of the education provided. See EMR under Patient Instructions for exercises provided during therapy sessions.    Assessment     Hollis is a 33 y.o. male referred to outpatient Physical Therapy with a medical diagnosis of S82.61XD (ICD-10-CM) - Closed disp fracture of right lateral malleolus with routine healing. Patient presents with signs and symptoms consistent with medical diagnosis. Presents with limited right ankle range of motion, ankle strength deficits, limited gastroc-soleus flexibility/strength, balance/proprioception deficits, and lateral ankle pain. Poor tolerance to single leg heel raises secondary to calf weakness and lateral ankle discomfort. Mild discomfort with ankle inversion and eversion. Patient would like to return to plyometric activities to return to sand volleyball league.     Patient prognosis is Excellent.   Patient will benefit from skilled outpatient " Physical Therapy to address the deficits stated above and in the chart below, provide patient /family education, and to maximize patientt's level of independence.     Plan of care discussed with patient: Yes  Patient's spiritual, cultural and educational needs considered and patient is agreeable to the plan of care and goals as stated below:     Anticipated Barriers for therapy: none    Medical Necessity is demonstrated by the following  History  Co-morbidities and personal factors that may impact the plan of care [] LOW: no personal factors / co-morbidities  [x] MODERATE: 1-2 personal factors / co-morbidities  [] HIGH: 3+ personal factors / co-morbidities    Moderate / High Support Documentation:   Co-morbidities affecting plan of care: none    Personal Factors:   lifestyle     Examination  Body Structures and Functions, activity limitations and participation restrictions that may impact the plan of care [] LOW: addressing 1-2 elements  [x] MODERATE: 3+ elements  [] HIGH: 4+ elements (please support below)    Moderate / High Support Documentation: Based on PMHX, co morbidities , data from assessments and functional level of assistance required with task and clinical presentation directly impacting function.         Clinical Presentation [] LOW: stable  [x] MODERATE: Evolving  [] HIGH: Unstable     Decision Making/ Complexity Score: moderate       Goals:  Short Term Goals (2.5 Weeks):  1. Patient will be compliant with home exercise program to supplement therapy in restoring pain free function.  2. Patient will improve dorsiflexion range of motion to 15 degrees to improve stair negotiation.  4. Patient will report no pain with resisted ankle inversion/eversion with theraband to demonstrate improved pain levels.     Long Term Goals (5 Weeks):  1. Patient will improve FOTO score to </= 18% limited to decrease perceived limitation with mobility.   2. Patient will improve impaired lower extremity manual muscle tests to  ">/= 4+/5 to improve dynamic hip/knee support for functional tasks.  3. Patient will be able to complete 20 repetitions of single leg heel raises on right to demonstrate improved gastroc-soleus complex strength.  4. Patient will return to plyometric activities pain-free to return to sports.   5. Patient will be able to tolerate single leg landing from 18" box pain-free to mimic sand volleyball landings.      Plan     Plan of care Certification: 10/27/2023 to 11/28/2023.    Outpatient Physical Therapy 1 times weekly for 5 weeks to include the following interventions: Gait Training, Manual Therapy, Moist Heat/ Ice, Neuromuscular Re-ed, Patient Education, Self Care, Therapeutic Activities, and Therapeutic Exercise.     Trudy Sinha PT        Physician's Signature: _________________________________________ Date: ________________    "

## 2023-11-03 ENCOUNTER — CLINICAL SUPPORT (OUTPATIENT)
Dept: REHABILITATION | Facility: HOSPITAL | Age: 33
End: 2023-11-03
Payer: COMMERCIAL

## 2023-11-03 DIAGNOSIS — M25.671 DECREASED RANGE OF MOTION OF RIGHT ANKLE: Primary | ICD-10-CM

## 2023-11-03 DIAGNOSIS — R53.1 DECREASED STRENGTH: ICD-10-CM

## 2023-11-03 PROCEDURE — 97110 THERAPEUTIC EXERCISES: CPT | Mod: PN

## 2023-11-03 PROCEDURE — 97112 NEUROMUSCULAR REEDUCATION: CPT | Mod: PN,97

## 2023-11-03 PROCEDURE — 97530 THERAPEUTIC ACTIVITIES: CPT | Mod: PN

## 2023-11-03 NOTE — PROGRESS NOTES
"[facility]   Physical Therapy Treatment Note      Name: Hollis Miranda   Clinic Number: [id number]    Therapy Diagnosis:   Encounter Diagnoses   Name Primary?    Decreased range of motion of right ankle Yes    Decreased strength      Physician: Jody Natarajan PA-C    Visit Date: 11/3/2023    Physician Orders: PT Eval and Treat   Medical Diagnosis from Referral: S82.61XD (ICD-10-CM) - Closed disp fracture of right lateral malleolus with routine healing  Evaluation Date: 10/27/2023  Authorization Period Expiration: 10/27/2024  Plan of Care Expiration: 11/28/2023  Progress Note Due: 11/15/2023  Visit # / Visits authorized: 1/ 20 (+1)  FOTO: 1/5     Precautions: Standard      Time In: 9:00  am   Time Out: 9:55 am   Total Billable Time: 55 minutes     PTA Visit #: 0/5       Subjective     Patient reports: Some soreness following initial evaluation. Has been compliant with HEP. .    He was compliant with home exercise program.  Response to previous treatment: ongoing   Functional change: ongoing     Pain: 3/10  Location: right ankles     Objective      Objective Measures updated at progress report unless specified.     Treatment     Hollis received the treatments listed below:      therapeutic exercises to develop strength, ROM, and flexibility for 15 minutes including:    Gastroc fitter stretch: 3x30"  Soleus fitter stretch: 3x30"  Double heel raises: 3x10  Seated heel raise: 30# - 3x10 right   Figure 4 posterior tibialis: 3x10 red   Side-lying shuttle press: 2 black and 1 red - 3x10 right   Dorsiflexion heel taps: 20x   INV/EVR taps: 2-point fitter - 25x     neuromuscular re-education activities to improve: Balance, Coordination, Kinesthetic, and Proprioception for 30 minutes. The following activities were included:  Lateral side stepping: red theraband around toes - 3 laps 10 steps  Single leg balance: airex - 2x30" right   3-way single leg balance ball toss: airex - 20x   Shuttle " "press single leg heel raise: 1 red and 1 black - 3x10 right   Single leg RDL: tapping yoga block - 2x10 stance on right   Single leg squat to 24" box: airex - 2x10 right    therapeutic activities to improve functional performance for 15  minutes, including:  Lateral heel taps: 4" step - 2x10  Forward heel taps: 4" step - 2x10  Reverse heel tap with therband resistance pulling into eversion: 3x10    Patient Education and Home Exercises       Education provided:   - compliance with HEP    Written Home Exercises Provided: yes. Exercises were reviewed and Hollis was able to demonstrate them prior to the end of the session.  Hollis demonstrated good  understanding of the education provided. See Electronic Medical Record under Patient Instructions for exercises provided during therapy sessions    Assessment     Hollis is a 33 y.o. male referred to outpatient Physical Therapy with a medical diagnosis of S82.61XD (ICD-10-CM) - Closed disp fracture of right lateral malleolus with routine healing. Patient presents with signs and symptoms consistent with medical diagnosis. Focused on ankle stabilization, lateral hip + lower extremity strengthening, and gastroc-soleus flexibility/strengthening. Good tolerance to interventions - no pain, only fatigue. Tempted to fall into inversion with single leg stabilization secondary to invertor weakness. Posterior ankle tightness with forward heel taps. Suspect ankle and gross lower extremity soreness following visit. Overall, good tolerance to first follow up.    Hollis Is progressing well towards his goals.   Patient prognosis is Excellent.     Patient will continue to benefit from skilled outpatient physical therapy to address the deficits listed in the problem list box on initial evaluation, provide pt/family education and to maximize pt's level of independence in the home and community environment.     Patient's spiritual, cultural and educational needs considered and pt agreeable " "to plan of care and goals.     Anticipated barriers to physical therapy: none     Goals:   Short Term Goals (2.5 Weeks):  1. Patient will be compliant with home exercise program to supplement therapy in restoring pain free function.  2. Patient will improve dorsiflexion range of motion to 15 degrees to improve stair negotiation.  4. Patient will report no pain with resisted ankle inversion/eversion with theraband to demonstrate improved pain levels.      Long Term Goals (5 Weeks):  1. Patient will improve FOTO score to </= 18% limited to decrease perceived limitation with mobility.   2. Patient will improve impaired lower extremity manual muscle tests to >/= 4+/5 to improve dynamic hip/knee support for functional tasks.  3. Patient will be able to complete 20 repetitions of single leg heel raises on right to demonstrate improved gastroc-soleus complex strength.  4. Patient will return to plyometric activities pain-free to return to sports.   5. Patient will be able to tolerate single leg landing from 18" box pain-free to mimic sand volleyball landings.      Plan     Gastroc-soleus flexibility/strengthening   Ankle stabilization   Progress to plyometrics     Trudy Sinha, PT       "

## 2023-11-07 ENCOUNTER — OFFICE VISIT (OUTPATIENT)
Dept: ORTHOPEDICS | Facility: CLINIC | Age: 33
End: 2023-11-07
Payer: COMMERCIAL

## 2023-11-07 ENCOUNTER — HOSPITAL ENCOUNTER (OUTPATIENT)
Dept: RADIOLOGY | Facility: HOSPITAL | Age: 33
Discharge: HOME | End: 2023-11-07
Attending: PHYSICIAN ASSISTANT
Payer: COMMERCIAL

## 2023-11-07 VITALS — BODY MASS INDEX: 25.91 KG/M2 | HEIGHT: 70 IN | WEIGHT: 181 LBS

## 2023-11-07 DIAGNOSIS — S82.61XD CLOSED DISP FRACTURE OF RIGHT LATERAL MALLEOLUS WITH ROUTINE HEALING: ICD-10-CM

## 2023-11-07 DIAGNOSIS — S82.61XD CLOSED DISP FRACTURE OF RIGHT LATERAL MALLEOLUS WITH ROUTINE HEALING: Primary | ICD-10-CM

## 2023-11-07 PROCEDURE — 99213 PR OFFICE/OUTPT VISIT, EST, LEVL III, 20-29 MIN: ICD-10-PCS | Mod: S$GLB,,, | Performed by: PHYSICIAN ASSISTANT

## 2023-11-07 PROCEDURE — 3008F BODY MASS INDEX DOCD: CPT | Mod: CPTII,S$GLB,, | Performed by: PHYSICIAN ASSISTANT

## 2023-11-07 PROCEDURE — 99999 PR PBB SHADOW E&M-EST. PATIENT-LVL III: CPT | Mod: PBBFAC,,, | Performed by: PHYSICIAN ASSISTANT

## 2023-11-07 PROCEDURE — 1159F PR MEDICATION LIST DOCUMENTED IN MEDICAL RECORD: ICD-10-PCS | Mod: CPTII,S$GLB,, | Performed by: PHYSICIAN ASSISTANT

## 2023-11-07 PROCEDURE — 73610 X-RAY EXAM OF ANKLE: CPT | Mod: 26,RT,, | Performed by: RADIOLOGY

## 2023-11-07 PROCEDURE — 99999 PR PBB SHADOW E&M-EST. PATIENT-LVL III: ICD-10-PCS | Mod: PBBFAC,,, | Performed by: PHYSICIAN ASSISTANT

## 2023-11-07 PROCEDURE — 73610 X-RAY EXAM OF ANKLE: CPT | Mod: TC,RT

## 2023-11-07 PROCEDURE — 73610 XR ANKLE COMPLETE 3 VIEW RIGHT: ICD-10-PCS | Mod: 26,RT,, | Performed by: RADIOLOGY

## 2023-11-07 PROCEDURE — 3008F PR BODY MASS INDEX (BMI) DOCUMENTED: ICD-10-PCS | Mod: CPTII,S$GLB,, | Performed by: PHYSICIAN ASSISTANT

## 2023-11-07 PROCEDURE — 99213 OFFICE O/P EST LOW 20 MIN: CPT | Mod: S$GLB,,, | Performed by: PHYSICIAN ASSISTANT

## 2023-11-07 PROCEDURE — 1159F MED LIST DOCD IN RCRD: CPT | Mod: CPTII,S$GLB,, | Performed by: PHYSICIAN ASSISTANT

## 2023-11-07 NOTE — PROGRESS NOTES
Subjective:     Patient ID: Hollis Miranda II is a 33 y.o. male.    Chief Complaint: No chief complaint on file.    HPI    Patient is a 33 year old male who presented to urgent care on 08/13/23 with right ankle pain that occurred the day prior after slipping on his kids play wig comming down his stairs.   RADS: acute nondisplaced extra-articular fracture of the right distal fibula  Patient has been in a boot. He is having increased pain with weightbearing. He has been icing elevating and using NSAID for pain.     09/05/23: Patient stated that he is doing better. He has been in the boot with ambulation. He is icing and elevating as needed.     09/26/23: Over all doing well. He stated that he does still have some swelling He tried a few times out the boot and the ankle tends to want to invert. He is not taking any pain medication.     11/07/23: Doing better. He is attending PT. Stated is helping. He has intermittent issues but over all doing well. Uses lace up ankle brace.     Review of Systems   Constitutional: Negative for chills and fever.   Cardiovascular:  Negative for chest pain.   Respiratory:  Negative for cough and shortness of breath.    Skin:  Negative for color change, dry skin, itching, nail changes, poor wound healing and rash.   Musculoskeletal:         Right ankle fracture    Neurological:  Negative for dizziness.   Psychiatric/Behavioral:  Negative for altered mental status. The patient is not nervous/anxious.    All other systems reviewed and are negative.      Objective:       General    Constitutional: He is oriented to person, place, and time. He appears well-developed and well-nourished. No distress.   HENT:   Head: Atraumatic.   Eyes: Conjunctivae are normal.   Cardiovascular:  Normal rate.            Pulmonary/Chest: Effort normal.   Neurological: He is alert and oriented to person, place, and time.   Psychiatric: He has a normal mood and affect. His behavior is normal.         Right  Ankle/Foot Exam     Range of Motion   The patient has normal right ankle ROM.    Other   Sensation: normal    Comments:  No TTP full range of motion         Physical Exam  Constitutional:       General: He is not in acute distress.     Appearance: He is well-developed and well-nourished. He is not diaphoretic.   HENT:      Head: Atraumatic.   Eyes:      Conjunctiva/sclera: Conjunctivae normal.   Cardiovascular:      Rate and Rhythm: Normal rate.   Pulmonary:      Effort: Pulmonary effort is normal.   Musculoskeletal:      Right ankle: Tenderness present.   Neurological:      Mental Status: He is alert and oriented to person, place, and time.   Psychiatric:         Mood and Affect: Mood and affect normal.         Behavior: Behavior normal.   RADS: reviewed by myself:  Reconfirmed subacute oblique nondisplaced distal fibula diametaphyseal fracture.  No new fractures.  Preserved tibiotalar articulation and talar dome.  Bimalleolar soft tissue swelling, greater laterally.  Tiny spurs developing at are Achilles tendon less so plantar aponeurosis attachments to calcaneus.  Minimal soft tissue swelling dorsally at the level of the visualized metatarsals    Assessment:     Encounter Diagnosis   Name Primary?    Closed disp fracture of right lateral malleolus with routine healing Yes         Plan:   Discussed plan with the patient. At this time plan is for conservative treatment. Weight bearing as tolerated, ROMAT,   Ankle brace as needed. ORdered PT. RICE to reduce pain and swelling prn . Multimodal pain control. Return to clinic if increase in pain at that time x-ray of his ankle is needed weight bear OOB.

## 2023-11-10 ENCOUNTER — CLINICAL SUPPORT (OUTPATIENT)
Dept: REHABILITATION | Facility: HOSPITAL | Age: 33
End: 2023-11-10
Payer: COMMERCIAL

## 2023-11-10 DIAGNOSIS — M25.671 DECREASED RANGE OF MOTION OF RIGHT ANKLE: Primary | ICD-10-CM

## 2023-11-10 DIAGNOSIS — R53.1 DECREASED STRENGTH: ICD-10-CM

## 2023-11-10 PROCEDURE — 97530 THERAPEUTIC ACTIVITIES: CPT | Mod: PN,97

## 2023-11-10 PROCEDURE — 97112 NEUROMUSCULAR REEDUCATION: CPT | Mod: PN,97

## 2023-11-10 NOTE — PROGRESS NOTES
"[facility]   Physical Therapy Treatment Note      Name: Hollis Miranda   Clinic Number: [id number]    Therapy Diagnosis:   Encounter Diagnoses   Name Primary?    Decreased range of motion of right ankle Yes    Decreased strength      Physician: Jody Natarajan PA-C    Visit Date: 11/10/2023    Physician Orders: PT Eval and Treat   Medical Diagnosis from Referral: S82.61XD (ICD-10-CM) - Closed disp fracture of right lateral malleolus with routine healing  Evaluation Date: 10/27/2023  Authorization Period Expiration: 10/27/2024  Plan of Care Expiration: 11/28/2023  Progress Note Due: 11/15/2023  Visit # / Visits authorized: 2/ 20 (+1)  FOTO: 2/5     Precautions: Standard      Time In: 10:00 am   Time Out: 10:55 am   Total Billable Time: 55 minutes     PTA Visit #: 0/5       Subjective     Patient reports: was able to run around and carmen his kids the other night with no pain.    He was compliant with home exercise program.  Response to previous treatment: soreness    Functional change: tolerance to functional activities     Pain: 0/10  Location: right ankles     Objective      Objective Measures updated at progress report unless specified.     Treatment     Hollis received the treatments listed below:      neuromuscular re-education activities to improve: Balance, Coordination, Kinesthetic, and Proprioception for 30 minutes. The following activities were included:    Lateral side stepping: red theraband around toes - 3 laps 10 steps  Single leg RDL: 5# dumbbell - 3x10 stance on right   Bosu squats: 3x10  Bosu single leg balance: 3x30"    3-way single leg balance ball toss: airex - 20x   Single leg squat to 24" box: airex under foot - 2x15 right  Heel raise with lacrosse ball squeeze: 3x10  Shuttle press single leg heel raise: 1 black and 1 black - 3x10 right   Figure 4 posterior tibialis: 3x10 red     therapeutic activities to improve functional performance for 25 minutes, " "including:  Shuttle press squat jumps: 2 black strap - 2x10  Shuttle press side lying single leg hop: 2x10 laying on left side and hopping on right   Sloshy hops: 10x. 2 rounds.   Reverse heel tap with therband resistance pulling into eversion: green - 2x10  Skater hops: 3x10 (4 feet apart)  Lateral heel taps: 6" step - 2x10  Forward heel taps: 6" step - 2x10      Patient Education and Home Exercises       Education provided:   - compliance with HEP    Written Home Exercises Provided: yes. Exercises were reviewed and Hollis was able to demonstrate them prior to the end of the session.  Hollis demonstrated good  understanding of the education provided. See Electronic Medical Record under Patient Instructions for exercises provided during therapy sessions    Assessment     Hollis is a 33 y.o. male referred to outpatient Physical Therapy with a medical diagnosis of S82.61XD (ICD-10-CM) - Closed disp fracture of right lateral malleolus with routine healing. Patient presents with signs and symptoms consistent with medical diagnosis. Initiated light double and single leg plyometrics with no complaints of sharp pains in lateral ankle. Improved single leg stability with less episodes of falling into ankle inversion. Additional invertor strengthening and hip/knee stabilization in bold. Slight valgus collapse noted with squats.    Hollis Is progressing well towards his goals.   Patient prognosis is Excellent.     Patient will continue to benefit from skilled outpatient physical therapy to address the deficits listed in the problem list box on initial evaluation, provide pt/family education and to maximize pt's level of independence in the home and community environment.     Patient's spiritual, cultural and educational needs considered and pt agreeable to plan of care and goals.     Anticipated barriers to physical therapy: none     Goals:   Short Term Goals (2.5 Weeks):  1. Patient will be compliant with home exercise " "program to supplement therapy in restoring pain free function.  2. Patient will improve dorsiflexion range of motion to 15 degrees to improve stair negotiation.  4. Patient will report no pain with resisted ankle inversion/eversion with theraband to demonstrate improved pain levels.      Long Term Goals (5 Weeks):  1. Patient will improve FOTO score to </= 18% limited to decrease perceived limitation with mobility.   2. Patient will improve impaired lower extremity manual muscle tests to >/= 4+/5 to improve dynamic hip/knee support for functional tasks.  3. Patient will be able to complete 20 repetitions of single leg heel raises on right to demonstrate improved gastroc-soleus complex strength.  4. Patient will return to plyometric activities pain-free to return to sports.   5. Patient will be able to tolerate single leg landing from 18" box pain-free to mimic sand volleyball landings.      Plan     Gastroc-soleus flexibility/strengthening   Ankle stabilization   Progress to plyometrics     Trudy Sinha, PT       "

## 2023-11-28 ENCOUNTER — CLINICAL SUPPORT (OUTPATIENT)
Dept: REHABILITATION | Facility: HOSPITAL | Age: 33
End: 2023-11-28
Payer: COMMERCIAL

## 2023-11-28 DIAGNOSIS — R53.1 DECREASED STRENGTH: ICD-10-CM

## 2023-11-28 DIAGNOSIS — M25.671 DECREASED RANGE OF MOTION OF RIGHT ANKLE: Primary | ICD-10-CM

## 2023-11-28 PROCEDURE — 97112 NEUROMUSCULAR REEDUCATION: CPT | Mod: PN

## 2023-11-28 PROCEDURE — 97530 THERAPEUTIC ACTIVITIES: CPT | Mod: PN,97

## 2023-11-28 NOTE — PROGRESS NOTES
[facility]   Physical Therapy Treatment Note / Discharge      Name: Hollis Miranda   Clinic Number: [id number]    Therapy Diagnosis:   Encounter Diagnoses   Name Primary?    Decreased range of motion of right ankle Yes    Decreased strength        Physician: Jody Natarajan PA-C    Visit Date: 11/28/2023    Physician Orders: PT Eval and Treat   Medical Diagnosis from Referral: S82.61XD (ICD-10-CM) - Closed disp fracture of right lateral malleolus with routine healing  Evaluation Date: 10/27/2023  Authorization Period Expiration: 10/27/2024  Plan of Care Expiration: 11/28/2023  Progress Note Due: 11/15/2023  Visit # / Visits authorized: 3/ 20 (+1)  FOTO: 3/5     Precautions: Standard      Time In: 11:00 am   Time Out: 11:40 am   Total Billable Time: 40 minutes     PTA Visit #: 0/5       Subjective     Patient reports: no pain with functional activities. Ready for discharge.    He was compliant with home exercise program.  Response to previous treatment: soreness    Functional change: tolerance to functional activities     Pain: 0/10  Location: right ankles     Objective      Objective Measures updated at progress report unless specified.     11/258/2023  Ankle   Left   Right Pain/Dysfunction with Movement     AROM PROM AROM PROM WNL   Dorsiflexion (20 deg) 17 NT 8 -- 15 13          Lower Extremity Strength                 LE           Right           Left   Hip flexion: 4+/5 4+/5   Knee flexion 5/5 5/5   Knee extension 5/5 5/5   Dorsiflexion 5/5 5/5   Plantarflexion 5/5 5/5   Inversion 5/5 5/5   Eversion  5/5 5/5   Great Toe 5/5 5/5     Special Tests:                              Right           Left   repeated heel raise 20x 20x        Treatment     Hollis received the treatments listed below:      neuromuscular re-education activities to improve: Balance, Coordination, Kinesthetic, and Proprioception for 30 minutes. The following activities were included:    Streetlife  "squats: 20x   Drop off single leg landing from 18" box: 5x   Single leg squats: 245" box - 10x  Forwards jogging outside: 2 laps   Lateral shuffle outside: 2 laps   Lateral agility cone drill: 2x   Backwards pedal: 2 laps   Quick feet hoppinx       Therapeutic activities to improve functional performance for 10  minutes, including:  FOTO  Objective tests and measures  Outcome measures  Home exercise program overview   Questions and answers   Discharge         Patient Education and Home Exercises       Education provided:   - compliance with HEP    Written Home Exercises Provided: yes. Exercises were reviewed and Hollis was able to demonstrate them prior to the end of the session.  Hollis demonstrated good  understanding of the education provided. See Electronic Medical Record under Patient Instructions for exercises provided during therapy sessions    Assessment     Hollis is a 33 y.o. male referred to outpatient Physical Therapy with a medical diagnosis of S82.61XD (ICD-10-CM) - Closed disp fracture of right lateral malleolus with routine healing. Patient presents with signs and symptoms consistent with medical diagnosis. Presents ready and agreeable for discharge. Met all short and long term goals. Provided updated HEP and instructed to continue plymetric training at home. Patient discharged at this time.     Hollis Is progressing well towards his goals.   Patient prognosis is Excellent.     Patient will continue to benefit from skilled outpatient physical therapy to address the deficits listed in the problem list box on initial evaluation, provide pt/family education and to maximize pt's level of independence in the home and community environment.     Patient's spiritual, cultural and educational needs considered and pt agreeable to plan of care and goals.     Anticipated barriers to physical therapy: none     Goals:   Short Term Goals (2.5 Weeks):  1. Patient will be compliant with home exercise program " "to supplement therapy in restoring pain free function.MET 11/28/2023  2. Patient will improve dorsiflexion range of motion to 15 degrees to improve stair negotiation.MET 11/28/2023  4. Patient will report no pain with resisted ankle inversion/eversion with theraband to demonstrate improved pain levels. MET 11/28/2023     Long Term Goals (5 Weeks):  1. Patient will improve FOTO score to </= 18% limited to decrease perceived limitation with mobility. MET 11/28/2023  2. Patient will improve impaired lower extremity manual muscle tests to >/= 4+/5 to improve dynamic hip/knee support for functional tasks.MET 11/28/2023  3. Patient will be able to complete 20 repetitions of single leg heel raises on right to demonstrate improved gastroc-soleus complex strength. MET 11/28/2023  4. Patient will return to plyometric activities pain-free to return to sports. MET 11/28/2023  5. Patient will be able to tolerate single leg landing from 18" box pain-free to mimic sand volleyball landings.  MET 11/28/2023    Plan     discharge    Trudy Sinha, PT       "

## 2024-02-06 ENCOUNTER — PATIENT MESSAGE (OUTPATIENT)
Dept: ADMINISTRATIVE | Facility: HOSPITAL | Age: 34
End: 2024-02-06
Payer: COMMERCIAL

## 2024-02-06 ENCOUNTER — PATIENT OUTREACH (OUTPATIENT)
Dept: ADMINISTRATIVE | Facility: HOSPITAL | Age: 34
End: 2024-02-06
Payer: COMMERCIAL

## 2024-02-06 NOTE — PROGRESS NOTES

## 2024-02-06 NOTE — LETTER
February 6, 2024    Hollis Miranda II  [address] [address]             [facility]  [address]  [place] [address]  Phone: [phone] Hello,     We are reviewing your medical record and noticed that you are due for an annual PCP visit. Are you still seeing Dr. Wilson? Are you ready to schedule an annual visit?        If you have any questions or need help scheduling an appointment don't hesitate to reach out to me via [facility] portal.       Brenda Modi MA  Clinical Care Coordinator   [facility]   [phone]

## 2024-02-12 ENCOUNTER — OFFICE VISIT (OUTPATIENT)
Dept: URGENT CARE | Facility: CLINIC | Age: 34
End: 2024-02-12
Payer: COMMERCIAL

## 2024-02-12 VITALS
SYSTOLIC BLOOD PRESSURE: 127 MMHG | DIASTOLIC BLOOD PRESSURE: 85 MMHG | TEMPERATURE: 99 F | RESPIRATION RATE: 16 BRPM | HEART RATE: 98 BPM | OXYGEN SATURATION: 98 % | HEIGHT: 70 IN | BODY MASS INDEX: 23.19 KG/M2 | WEIGHT: 162 LBS

## 2024-02-12 DIAGNOSIS — J06.9 VIRAL URI WITH COUGH: Primary | ICD-10-CM

## 2024-02-12 DIAGNOSIS — R05.9 COUGH, UNSPECIFIED TYPE: ICD-10-CM

## 2024-02-12 LAB
CTP QC/QA: YES
CTP QC/QA: YES
POC MOLECULAR INFLUENZA A AGN: NEGATIVE
POC MOLECULAR INFLUENZA B AGN: NEGATIVE
SARS-COV-2 AG RESP QL IA.RAPID: NEGATIVE

## 2024-02-12 PROCEDURE — 87502 INFLUENZA DNA AMP PROBE: CPT | Mod: QW,S$GLB,, | Performed by: SURGERY

## 2024-02-12 PROCEDURE — 99214 OFFICE O/P EST MOD 30 MIN: CPT | Mod: S$GLB,,, | Performed by: SURGERY

## 2024-02-12 PROCEDURE — 87811 SARS-COV-2 COVID19 W/OPTIC: CPT | Mod: QW,S$GLB,, | Performed by: SURGERY

## 2024-02-12 RX ORDER — OSELTAMIVIR PHOSPHATE 75 MG/1
75 CAPSULE ORAL 2 TIMES DAILY
Qty: 10 CAPSULE | Refills: 0 | Status: SHIPPED | OUTPATIENT
Start: 2024-02-12 | End: 2024-02-17

## 2024-02-12 RX ORDER — PROMETHAZINE HYDROCHLORIDE AND DEXTROMETHORPHAN HYDROBROMIDE 6.25; 15 MG/5ML; MG/5ML
5 SYRUP ORAL EVERY 4 HOURS PRN
Qty: 180 ML | Refills: 0 | Status: SHIPPED | OUTPATIENT
Start: 2024-02-12 | End: 2024-02-19

## 2024-02-12 NOTE — PATIENT INSTRUCTIONS
Patient Instructions       - Rest.    - Drink plenty of fluids.     - Tylenol or Ibuprofen as directed as needed for fever/pain. Do not exceed tylenol 3,000 mg/day or ibuprofen 2,400 mg/day. Avoid tylenol if you have a history of liver disease. Do not take ibuprofen if you have a history of GI bleeding, kidney disease, or if you take blood thinners.      - You can take over-the-counter claritin, zyrtec, allegra, or xyzal as directed. These are antihistamines that can help with runny nose, nasal congestion, sneezing, and helps to dry up post-nasal drip, which usually causes sore throat and cough.              - If you do NOT have high blood pressure, you may use a decongestant form (D) of this medication (ie. Claritin- D, zyrtec-D, allegra-D) or if you do not take the D form, you can take sudafed (pseudoephedrine) over the counter, which is a decongestant. Do NOT take two decongestant (D) medications at the same time (such as mucinex-D and claritin-D or plain sudafed and claritin D)     - You can use Flonase (fluticasone) nasal spray as directed for sinus congestion and postnasal drip. This is a steroid nasal spray that works locally over time to decrease the inflammation in your nose/sinuses and help with allergic symptoms. This is not an quick- relief spray like afrin, but it works well if used daily.  Discontinue if you develop nose bleed.  - Also available, Astepro is available as a nasal spray. This is an antihistamine nasal spray which also should be used daily. You may use 2 sprays each nostril twice a day until no longer needed. It helps with sinus pressure, postnasal drip and cough caused by post nasal drip or allergies.   - use nasal saline prior to Flonase or Astepro.   - Use Ocean Spray Nasal Saline 1-3 puffs each nostril every 2-3 hours then blow out onto tissue. This is to irrigate the nasal passage way to clear the sinus openings. Use until sinus problem resolved.     - warm tea with honey can help  with cough. Honey is a natural cough suppressant.     - Dextromethorphan (DM) is a cough suppressant over the counter (ie. mucinex DM, robitussin, delsym; dayquil/nyquil has DM as well.)     - Follow up with your PCP or specialty clinic as directed in the next 1-2 weeks if not improved or as needed.  You can call [phone] to schedule an appointment with the appropriate provider.       - Go to the ER if you develop new or worsening symptoms.      - You must understand that you have received an Urgent Care treatment only and that you may be released before all of your medical problems are known or treated.   - You, the patient, will arrange for follow up care as instructed.   - If your condition worsens or fails to improve we recommend that you receive another evaluation at the ER immediately or contact your PCP to discuss your concerns or return here.

## 2024-02-12 NOTE — PROGRESS NOTES
"Subjective:      Patient ID: Hollis Miranda II is a 33 y.o. male.    Vitals:  height is 5' 10" (1.778 m) and weight is 73.5 kg (162 lb). His oral temperature is 98.9 °F (37.2 °C). His blood pressure is 127/85 and his pulse is 98. His respiration is 16 and oxygen saturation is 98%.     Chief Complaint: Cough    This is a 33 y.o. male who presents today with a chief complaint of cough congestion body aches chest pain that started 2 days ago. Pt stated that the chest pain started first before the other symptoms       Cough  This is a new problem. The current episode started in the past 7 days. The problem has been unchanged. The problem occurs constantly. The cough is Productive of sputum. Associated symptoms include chest pain. Pertinent negatives include no chills, ear congestion, ear pain, fever, headaches, heartburn, hemoptysis, myalgias, nasal congestion, postnasal drip, rash, rhinorrhea, sore throat, shortness of breath, sweats, weight loss or wheezing. Nothing aggravates the symptoms. Treatments tried: delsum and ibuprofen. The treatment provided mild relief. There is no history of asthma, bronchiectasis, bronchitis, COPD, emphysema, environmental allergies or pneumonia.     Constitution: Negative for chills and fever.   HENT:  Negative for ear pain, postnasal drip and sore throat.    Cardiovascular:  Positive for chest pain.   Respiratory:  Positive for cough. Negative for bloody sputum, shortness of breath and wheezing.    Gastrointestinal:  Negative for heartburn.   Musculoskeletal:  Negative for muscle ache.   Skin:  Negative for rash.   Allergic/Immunologic: Negative for environmental allergies.   Neurological:  Negative for headaches.      Objective:     Physical Exam   Constitutional: He is oriented to person, place, and time. He appears well-developed. He is cooperative.  Non-toxic appearance. He does not appear ill. No distress.   HENT:   Head: Normocephalic and atraumatic.   Ears:   Right Ear: " Hearing, tympanic membrane, external ear and ear canal normal.   Left Ear: Hearing, tympanic membrane, external ear and ear canal normal.   Nose: Mucosal edema and rhinorrhea present. No nasal deformity. No epistaxis. Right sinus exhibits no maxillary sinus tenderness and no frontal sinus tenderness. Left sinus exhibits no maxillary sinus tenderness and no frontal sinus tenderness.   Mouth/Throat: Uvula is midline and mucous membranes are normal. No trismus in the jaw. Normal dentition. No uvula swelling. Posterior oropharyngeal edema and posterior oropharyngeal erythema present. No oropharyngeal exudate. Tonsils are 1+ on the right. Tonsils are 1+ on the left. No tonsillar exudate.   Eyes: Conjunctivae and lids are normal. No scleral icterus.   Neck: Trachea normal and phonation normal. Neck supple. No edema present. No erythema present. No neck rigidity present.   Cardiovascular: Normal rate, regular rhythm, normal heart sounds and normal pulses.   Pulmonary/Chest: Effort normal and breath sounds normal. No respiratory distress. He has no decreased breath sounds. He has no rhonchi.   Abdominal: Normal appearance.   Musculoskeletal: Normal range of motion.         General: No deformity. Normal range of motion.   Neurological: He is alert and oriented to person, place, and time. He exhibits normal muscle tone. Coordination normal.   Skin: Skin is warm, dry, intact, not diaphoretic and not pale.   Psychiatric: His speech is normal and behavior is normal. Judgment and thought content normal.   Nursing note and vitals reviewed.      Assessment:     1. Viral URI with cough    2. Cough, unspecified type        Plan:       Viral URI with cough  -     oseltamivir (TAMIFLU) 75 MG capsule; Take 1 capsule (75 mg total) by mouth 2 (two) times daily. for 5 days  Dispense: 10 capsule; Refill: 0  -     promethazine-dextromethorphan (PROMETHAZINE-DM) 6.25-15 mg/5 mL Syrp; Take 5 mLs by mouth every 4 (four) hours as needed (cough).   Dispense: 180 mL; Refill: 0    Cough, unspecified type  -     POCT Influenza A/B MOLECULAR  -     SARS Coronavirus 2 Antigen, POCT Manual Read

## 2024-07-08 ENCOUNTER — OFFICE VISIT (OUTPATIENT)
Dept: INTERNAL MEDICINE | Facility: CLINIC | Age: 34
End: 2024-07-08
Payer: COMMERCIAL

## 2024-07-08 VITALS
BODY MASS INDEX: 24.18 KG/M2 | SYSTOLIC BLOOD PRESSURE: 122 MMHG | RESPIRATION RATE: 18 BRPM | TEMPERATURE: 97 F | OXYGEN SATURATION: 97 % | HEIGHT: 70 IN | DIASTOLIC BLOOD PRESSURE: 94 MMHG | HEART RATE: 88 BPM | WEIGHT: 168.88 LBS

## 2024-07-08 DIAGNOSIS — J30.89 ENVIRONMENTAL AND SEASONAL ALLERGIES: ICD-10-CM

## 2024-07-08 DIAGNOSIS — Z00.00 ENCOUNTER FOR ANNUAL HEALTH EXAMINATION: Primary | ICD-10-CM

## 2024-07-08 DIAGNOSIS — R03.0 ELEVATED BLOOD PRESSURE READING WITHOUT DIAGNOSIS OF HYPERTENSION: ICD-10-CM

## 2024-07-08 DIAGNOSIS — F90.2 ADHD (ATTENTION DEFICIT HYPERACTIVITY DISORDER), COMBINED TYPE: ICD-10-CM

## 2024-07-08 PROCEDURE — 1160F RVW MEDS BY RX/DR IN RCRD: CPT | Mod: CPTII,S$GLB,, | Performed by: NURSE PRACTITIONER

## 2024-07-08 PROCEDURE — 3080F DIAST BP >= 90 MM HG: CPT | Mod: CPTII,S$GLB,, | Performed by: NURSE PRACTITIONER

## 2024-07-08 PROCEDURE — 3008F BODY MASS INDEX DOCD: CPT | Mod: CPTII,S$GLB,, | Performed by: NURSE PRACTITIONER

## 2024-07-08 PROCEDURE — 1159F MED LIST DOCD IN RCRD: CPT | Mod: CPTII,S$GLB,, | Performed by: NURSE PRACTITIONER

## 2024-07-08 PROCEDURE — 99395 PREV VISIT EST AGE 18-39: CPT | Mod: S$GLB,,, | Performed by: NURSE PRACTITIONER

## 2024-07-08 PROCEDURE — 3074F SYST BP LT 130 MM HG: CPT | Mod: CPTII,S$GLB,, | Performed by: NURSE PRACTITIONER

## 2024-07-08 PROCEDURE — 99999 PR PBB SHADOW E&M-EST. PATIENT-LVL IV: CPT | Mod: PBBFAC,,, | Performed by: NURSE PRACTITIONER

## 2024-07-08 RX ORDER — CETIRIZINE HYDROCHLORIDE 10 MG/1
10 TABLET ORAL DAILY PRN
Qty: 90 TABLET | Refills: 3 | Status: SHIPPED | OUTPATIENT
Start: 2024-07-08 | End: 2025-07-08

## 2024-07-08 RX ORDER — DEXTROAMPHETAMINE SACCHARATE, AMPHETAMINE ASPARTATE, DEXTROAMPHETAMINE SULFATE AND AMPHETAMINE SULFATE 3.75; 3.75; 3.75; 3.75 MG/1; MG/1; MG/1; MG/1
15 TABLET ORAL DAILY
Qty: 30 TABLET | Refills: 0 | Status: SHIPPED | OUTPATIENT
Start: 2024-07-08

## 2024-07-08 NOTE — PROGRESS NOTES
" Patient ID: Hollis Miranda II is a 33 y.o. male.    Chief Complaint: Annual Wellness Visit    Hollis Miranda II is a 33 y.o. male who presents today for an annual wellness visit.     {Blank single:66070::"He","She"} voices no concerns or complaints.     Review of patient's allergies indicates:  No Known Allergies   Medication List with Changes/Refills   Current Medications    DEXTROAMPHETAMINE-AMPHETAMINE (ADDERALL XR) 15 MG 24 HR CAPSULE    Take 1 capsule (15 mg total) by mouth every morning.    DEXTROAMPHETAMINE-AMPHETAMINE (ADDERALL XR) 15 MG 24 HR CAPSULE    Take 1 capsule (15 mg total) by mouth every morning.    DEXTROAMPHETAMINE-AMPHETAMINE (ADDERALL XR) 15 MG 24 HR CAPSULE    Take 1 capsule (15 mg total) by mouth every morning.         Health Maintenance         Date Due Completion Date    COVID-19 Vaccine (3 - 2023-24 season) 09/01/2023 9/24/2021    Influenza Vaccine (1) 09/01/2024 10/25/2013    TETANUS VACCINE 02/28/2028 2/28/2018          On average, how many days per week do you do moderate to strenuous exercise:  (like a brisk walk or jog; this does not include your job/work)  0   On average, how many minutes do you exercise at this level each day? {Blank single:89473::"0","30","45","60","90"}  We encourage you to start exercising if you do not already, continue at your current level or increase your level of activity.     Do you eat fruits and vegetable every day?  Yes    Have you ever been a victim of threats, physical hurting, or do you not feel safe at home?  {Blank single:33671::"Yes","No"}    Have you ever used tobacco products? No    Have you ever been screened for HIV? {Blank single:91814::"Yes","No"}  Would you like to be screened for HIV? {Blank single:88697::"No","Yes"}    Do you use condoms for protection against STD {Blank single:57110::"No","Yes","N/A"}       If yes,  {Blank single:16179::"Always","Sometimes","N/A"}     If female and sex with a male partner, do either of you " "use protection against pregnancy?  {Blank single:65990::"Yes","No","N/A"}    If yes, what kind of protection   {Blank single:19197::"condoms","birth control pills","IUD","Depo-Provera Injection"," Other","N/A"}  Surgical Method:{Blank single:19673::"Tubal Ligation","Hysterectomy","Partner had vasectomy","N/A"}    Do you still have a menstrual cycle? {Blank single:22948::"Yes","No"}       If not,  {Blank single:19197::"Hysterectomy","menopause","contraception that interferes with cycles","PCOS"}       If yes, please describe your cycles: {Blank single:19197::"Regular","Irregular","Heavy","Painful","Absent"}  Do you plan/wish to become pregnant in the next year?  {Blank single:19197::"Yes","No","N/A"}    For post-menopausal women:  Are you taking a daily supplement with both Vitamin D and Calcium? {Blank single:19197::"Yes","No"}  Have you had any bleeding since you stopped having periods?  {Blank single:19197::"Yes","No"}  Do you have any issues with leaking of urine? {Blank single:19197::"Yes","No"}  Have you ever considered medication? {Blank single:19197::"Yes","No"}    Do you go to the dentist regularly? Yes  When was you last exam: 5 months ago    Do you go to the eye doctor regularly? Yes  When was your last exam:  end of last year     Do you wear contacts, glasses, reading glasses? Yes    Do you have any personal or family history of breast/ovarian/colon cancer?  Yes       If yes, who? Father    Do you have any personal or family history of heart disease? Yes       If yes, who?  Mother    Have you often been bothered by feeling down, depressed, or hopeless?  Not at all    How often do you drink alcohol?  Weekends    Review of Systems   Constitutional:  Negative for activity change and unexpected weight change.   HENT:  Positive for rhinorrhea. Negative for hearing loss and trouble swallowing.    Eyes:  Negative for discharge and visual disturbance.   Respiratory:  Negative for chest tightness and wheezing.  " "  Cardiovascular:  Negative for chest pain and palpitations.   Gastrointestinal:  Negative for blood in stool, constipation, diarrhea and vomiting.   Endocrine: Negative for polydipsia and polyuria.   Genitourinary:  Negative for difficulty urinating, hematuria and urgency.   Musculoskeletal:  Negative for arthralgias, joint swelling and neck pain.   Neurological:  Negative for weakness and headaches.   Psychiatric/Behavioral:  Negative for confusion and dysphoric mood.       Review of Systems   Constitutional:  Negative for activity change and unexpected weight change.   HENT:  Positive for rhinorrhea. Negative for hearing loss and trouble swallowing.    Eyes:  Negative for discharge and visual disturbance.   Respiratory:  Negative for chest tightness and wheezing.    Cardiovascular:  Negative for chest pain and palpitations.   Gastrointestinal:  Negative for blood in stool, constipation, diarrhea and vomiting.   Genitourinary:  Negative for difficulty urinating, hematuria and urgency.   Musculoskeletal:  Negative for arthralgias, joint swelling and neck pain.   Neurological:  Negative for weakness and headaches.   Endo/Heme/Allergies:  Negative for polydipsia.   Psychiatric/Behavioral:  Negative for confusion and dysphoric mood.        Objective:     BP (!) 126/92 (BP Location: Right arm, Patient Position: Sitting, BP Method: Medium (Manual))   Pulse 88   Temp 97.3 °F (36.3 °C) (Temporal)   Resp 18   Ht 5' 10" (1.778 m)   Wt 76.6 kg (168 lb 14 oz)   SpO2 97%   BMI 24.23 kg/m²     BP Readings from Last 10 Encounters:   07/08/24 (!) 122/94   02/12/24 127/85   08/16/23 (!) 137/93   05/18/22 116/88   12/22/21 138/89   02/24/21 129/78   10/23/16 125/84     Physical Exam  Constitutional:       Appearance: Normal appearance. He is normal weight.   HENT:      Head: Normocephalic and atraumatic.   Eyes:      Conjunctiva/sclera: Conjunctivae normal.   Cardiovascular:      Rate and Rhythm: Normal rate and regular " "rhythm.      Pulses: Normal pulses.   Pulmonary:      Effort: Pulmonary effort is normal.      Breath sounds: Normal breath sounds.   Abdominal:      Palpations: Abdomen is soft.      Tenderness: There is no abdominal tenderness.   Musculoskeletal:         General: Normal range of motion.      Cervical back: Normal range of motion and neck supple.      Right lower leg: No edema.      Left lower leg: No edema.   Neurological:      Mental Status: He is alert. Mental status is at baseline.   Psychiatric:         Mood and Affect: Mood normal.         Behavior: Behavior normal.       BP Readings from Last 3 Encounters:   07/08/24 (!) 126/92   02/12/24 127/85   08/16/23 (!) 137/93     Wt Readings from Last 3 Encounters:   07/08/24 76.6 kg (168 lb 14 oz)   02/12/24 73.5 kg (162 lb)   11/07/23 82.1 kg (181 lb)       I reviewed and independently interpreted the labs and imaging below:  Last Labs:  Glucose   Date Value Ref Range Status   08/17/2022 88 70 - 100 mg/dL Final     BUN   Date Value Ref Range Status   08/17/2022 13.0 7.0 - 21.0 mg/dL Final     Creatinine   Date Value Ref Range Status   08/17/2022 0.86 0.70 - 1.20 mg/dL Final     Sodium   Date Value Ref Range Status   08/17/2022 141 135 - 145 mmol/L Final     Potassium   Date Value Ref Range Status   08/17/2022 4.2 3.5 - 5.0 mmol/L Final     AST   Date Value Ref Range Status   08/17/2022 11 7 - 40 U/L Final     ALT   Date Value Ref Range Status   08/17/2022 10 7 - 56 U/L Final     Cholesterol   Date Value Ref Range Status   08/17/2022 200 (H) 100 - 180 mg/dL Final     No results found for: "HGBA1C"  Lab Results   Component Value Date    WBC 5.4 08/17/2022    HGB 16.4 08/17/2022    HCT 47.9 08/17/2022     08/17/2022     Lab Results   Component Value Date    TSH 2.67 08/17/2022       Assessment and Plan:     1. Encounter for annual health examination       Encounter for wellness examination in adult  1. Patient Counseling:  --Nutrition: Discussed the importance " of moderate caffeine intake. Adhering to a low sodium, low saturated fat/low cholesterol diet.   --Exercise: Discussed the importance of regular exercise. At least 30 minutes 5 days per week.   --Substance Abuse: Discussed cessation and resources available to assist.  --Sexuality: Discussed sexually transmitted diseases, use of condoms and contraceptive alternatives.   --Injury prevention: Discussed safety belts, smoke detector and smoking near bedding or upholstery. Encouraged patient to have fire extinguisher in home.  --Dental health: Discussed importance of regular tooth brushing, flossing, and dental visits.  --Immunizations reviewed.  --Discussed benefits of maintaining up to date health maintenance.  -- Encouraged good sleep hygiene.       2. Discussed the patient's BMI with her. Patient's BMI isBMI@               --General weight loss/lifestyle modification strategies discussed such as moderate caloric deficit, high quality food choices and referral to dietician as needed.     A total of ** minutes of this visit was spent on obesity counseling.     3. Tobacco Use                -- Smoking cessation was discussed with patient. Advised of hazards to health including increased risk of heart attack, stroke, worsening lung function and cancer.                   Patient urged to stop smoking and was offered assistance.     A total of ** minutes of this visit was spent on tobacco cessation counseling.    Abbygail Jeansonne, TERESAN, RN, BMTCN  Family Nurse Practitioner Student

## 2024-07-08 NOTE — PROGRESS NOTES
Patient ID: Hollis Miranda II is a 33 y.o. male.    Chief Complaint: Annual Wellness Visit    Hollis Miranda II is a 33 y.o. male who presents today for an annual wellness visit.     He voices no concerns or complaints. He would like to restart his adderall as immediate release as he has tried it before and states it has helped.  He states he takes zyrtec PRN for seasonal allergies and asking for a prescription.      Review of patient's allergies indicates:  No Known Allergies   Medication List with Changes/Refills   New Medications    CETIRIZINE (ZYRTEC) 10 MG TABLET    Take 1 tablet (10 mg total) by mouth daily as needed for Allergies (Allergies).    DEXTROAMPHETAMINE-AMPHETAMINE (ADDERALL) 15 MG TABLET    Take 1 tablet (15 mg total) by mouth once daily.   Current Medications    DEXTROAMPHETAMINE-AMPHETAMINE (ADDERALL XR) 15 MG 24 HR CAPSULE    Take 1 capsule (15 mg total) by mouth every morning.   Discontinued Medications    DEXTROAMPHETAMINE-AMPHETAMINE (ADDERALL XR) 15 MG 24 HR CAPSULE    Take 1 capsule (15 mg total) by mouth every morning.    DEXTROAMPHETAMINE-AMPHETAMINE (ADDERALL XR) 15 MG 24 HR CAPSULE    Take 1 capsule (15 mg total) by mouth every morning.     Health Maintenance         Date Due Completion Date    COVID-19 Vaccine (3 - 2023-24 season) 09/01/2023 9/24/2021    Influenza Vaccine (1) 09/01/2024 10/25/2013    TETANUS VACCINE 02/28/2028 2/28/2018          On average, how many days per week do you do moderate to strenuous exercise:  (like a brisk walk or jog; this does not include your job/work)  0     Do you eat fruits and vegetable every day?  Yes    Have you ever used tobacco products? No    Do you go to the dentist regularly? Yes  When was you last exam: 5 months ago    Do you go to the eye doctor regularly? Yes  When was your last exam:  end of last year     Do you wear contacts, glasses, reading glasses? Yes    Do you have any personal or family history of  "breast/ovarian/colon cancer?  Yes       If yes, who? Father    Do you have any personal or family history of heart disease? Yes       If yes, who?  Mother    Have you often been bothered by feeling down, depressed, or hopeless?  Not at all    How often do you drink alcohol?  Weekends    Review of Systems   Constitutional:  Negative for activity change and unexpected weight change.   HENT:  Positive for rhinorrhea. Negative for hearing loss and trouble swallowing.    Eyes:  Negative for discharge and visual disturbance.   Respiratory:  Negative for chest tightness and wheezing.    Cardiovascular:  Negative for chest pain and palpitations.   Gastrointestinal:  Negative for blood in stool, constipation, diarrhea and vomiting.   Endocrine: Negative for polydipsia and polyuria.   Genitourinary:  Negative for difficulty urinating, hematuria and urgency.   Musculoskeletal:  Negative for arthralgias, joint swelling and neck pain.   Neurological:  Negative for weakness and headaches.   Psychiatric/Behavioral:  Negative for confusion and dysphoric mood.       Objective:     BP (!) 122/94   Pulse 88   Temp 97.3 °F (36.3 °C) (Temporal)   Resp 18   Ht 5' 10" (1.778 m)   Wt 76.6 kg (168 lb 14 oz)   SpO2 97%   BMI 24.23 kg/m²     BP Readings from Last 10 Encounters:   07/08/24 (!) 122/94   02/12/24 127/85   08/16/23 (!) 137/93   05/18/22 116/88   12/22/21 138/89   02/24/21 129/78   10/23/16 125/84     Physical Exam  Constitutional:       Appearance: Normal appearance. He is normal weight.   HENT:      Head: Normocephalic and atraumatic.   Eyes:      Conjunctiva/sclera: Conjunctivae normal.   Cardiovascular:      Rate and Rhythm: Normal rate and regular rhythm.      Pulses: Normal pulses.   Pulmonary:      Effort: Pulmonary effort is normal.      Breath sounds: Normal breath sounds.   Abdominal:      Palpations: Abdomen is soft.      Tenderness: There is no abdominal tenderness.   Musculoskeletal:         General: Normal " "range of motion.      Cervical back: Normal range of motion and neck supple.      Right lower leg: No edema.      Left lower leg: No edema.   Neurological:      Mental Status: He is alert. Mental status is at baseline.   Psychiatric:         Mood and Affect: Mood normal.         Behavior: Behavior normal.       BP Readings from Last 3 Encounters:   07/08/24 (!) 122/94   02/12/24 127/85   08/16/23 (!) 137/93     Wt Readings from Last 3 Encounters:   07/08/24 76.6 kg (168 lb 14 oz)   02/12/24 73.5 kg (162 lb)   11/07/23 82.1 kg (181 lb)       I reviewed and independently interpreted the labs and imaging below:  Last Labs:  Glucose   Date Value Ref Range Status   08/17/2022 88 70 - 100 mg/dL Final     BUN   Date Value Ref Range Status   08/17/2022 13.0 7.0 - 21.0 mg/dL Final     Creatinine   Date Value Ref Range Status   08/17/2022 0.86 0.70 - 1.20 mg/dL Final     Sodium   Date Value Ref Range Status   08/17/2022 141 135 - 145 mmol/L Final     Potassium   Date Value Ref Range Status   08/17/2022 4.2 3.5 - 5.0 mmol/L Final     AST   Date Value Ref Range Status   08/17/2022 11 7 - 40 U/L Final     ALT   Date Value Ref Range Status   08/17/2022 10 7 - 56 U/L Final     Cholesterol   Date Value Ref Range Status   08/17/2022 200 (H) 100 - 180 mg/dL Final     No results found for: "HGBA1C"  Lab Results   Component Value Date    WBC 5.4 08/17/2022    HGB 16.4 08/17/2022    HCT 47.9 08/17/2022     08/17/2022     Lab Results   Component Value Date    TSH 2.67 08/17/2022       Assessment and Plan:     1. Encounter for annual health examination  1. Patient Counseling:  --Nutrition: Discussed the importance of moderate caffeine intake. Adhering to a low sodium, low saturated fat/low cholesterol diet.   --Exercise: Discussed the importance of regular exercise. At least 30 minutes 5 days per week.   --Dental health: Discussed importance of regular tooth brushing, flossing, and dental visits.  --Immunizations " reviewed.  --Discussed benefits of maintaining up to date health maintenance.  - CBC Without Differential; Future  - COMPREHENSIVE METABOLIC PANEL; Future  - TSH; Future  - LIPID PANEL; Future  - HEMOGLOBIN A1C; Future    2. ADHD (attention deficit hyperactivity disorder), combined type  - dextroamphetamine-amphetamine (ADDERALL) 15 mg tablet; Take 1 tablet (15 mg total) by mouth once daily.  Dispense: 30 tablet; Refill: 0  -Advised to monitor BP at home   -Follow-up in 3 months with PCP    3. Environmental and seasonal allergies  - cetirizine (ZYRTEC) 10 MG tablet; Take 1 tablet (10 mg total) by mouth daily as needed for Allergies (Allergies).  Dispense: 90 tablet; Refill: 3     4. Elevated blood pressure reading without diagnosis of hypertension    Patient denies history of HTN - he typically follows a low salt diet and limited caffeine. He has been advised to monitor BP at home while on Adderall.       Abbygail Jeansonne, BSN, RN, BMTCN  Family Nurse Practitioner Student       As the co-signing provider I have reviewed the students documentation and am responsible for the treatment, assessment, and plan.

## 2024-08-08 ENCOUNTER — PATIENT MESSAGE (OUTPATIENT)
Dept: INTERNAL MEDICINE | Facility: CLINIC | Age: 34
End: 2024-08-08
Payer: COMMERCIAL

## 2024-08-08 DIAGNOSIS — F90.2 ADHD (ATTENTION DEFICIT HYPERACTIVITY DISORDER), COMBINED TYPE: ICD-10-CM

## 2024-08-08 RX ORDER — DEXTROAMPHETAMINE SACCHARATE, AMPHETAMINE ASPARTATE, DEXTROAMPHETAMINE SULFATE AND AMPHETAMINE SULFATE 3.75; 3.75; 3.75; 3.75 MG/1; MG/1; MG/1; MG/1
15 TABLET ORAL DAILY
Qty: 30 TABLET | Refills: 0 | Status: SHIPPED | OUTPATIENT
Start: 2024-08-08

## 2024-08-13 DIAGNOSIS — F90.2 ADHD (ATTENTION DEFICIT HYPERACTIVITY DISORDER), COMBINED TYPE: ICD-10-CM

## 2024-08-13 RX ORDER — DEXTROAMPHETAMINE SACCHARATE, AMPHETAMINE ASPARTATE, DEXTROAMPHETAMINE SULFATE AND AMPHETAMINE SULFATE 3.75; 3.75; 3.75; 3.75 MG/1; MG/1; MG/1; MG/1
15 TABLET ORAL DAILY
Qty: 30 TABLET | Refills: 0 | Status: CANCELLED | OUTPATIENT
Start: 2024-08-13

## 2024-08-16 ENCOUNTER — TELEPHONE (OUTPATIENT)
Dept: INTERNAL MEDICINE | Facility: CLINIC | Age: 34
End: 2024-08-16
Payer: COMMERCIAL

## 2024-10-01 ENCOUNTER — PATIENT MESSAGE (OUTPATIENT)
Dept: INTERNAL MEDICINE | Facility: CLINIC | Age: 34
End: 2024-10-01
Payer: COMMERCIAL

## 2024-10-07 DIAGNOSIS — F90.2 ADHD (ATTENTION DEFICIT HYPERACTIVITY DISORDER), COMBINED TYPE: ICD-10-CM

## 2024-10-07 RX ORDER — DEXTROAMPHETAMINE SACCHARATE, AMPHETAMINE ASPARTATE, DEXTROAMPHETAMINE SULFATE AND AMPHETAMINE SULFATE 3.75; 3.75; 3.75; 3.75 MG/1; MG/1; MG/1; MG/1
15 TABLET ORAL DAILY
Qty: 30 TABLET | Refills: 0 | Status: SHIPPED | OUTPATIENT
Start: 2024-10-07

## 2024-10-08 ENCOUNTER — PATIENT MESSAGE (OUTPATIENT)
Dept: INTERNAL MEDICINE | Facility: CLINIC | Age: 34
End: 2024-10-08
Payer: COMMERCIAL

## 2024-12-13 ENCOUNTER — OFFICE VISIT (OUTPATIENT)
Dept: INTERNAL MEDICINE | Facility: CLINIC | Age: 34
End: 2024-12-13
Payer: COMMERCIAL

## 2024-12-13 VITALS
TEMPERATURE: 97 F | BODY MASS INDEX: 24.65 KG/M2 | SYSTOLIC BLOOD PRESSURE: 124 MMHG | HEIGHT: 70 IN | DIASTOLIC BLOOD PRESSURE: 80 MMHG | OXYGEN SATURATION: 99 % | WEIGHT: 172.19 LBS | HEART RATE: 79 BPM | RESPIRATION RATE: 16 BRPM

## 2024-12-13 DIAGNOSIS — F90.2 ADHD (ATTENTION DEFICIT HYPERACTIVITY DISORDER), COMBINED TYPE: Primary | ICD-10-CM

## 2024-12-13 DIAGNOSIS — Z01.30 BP CHECK: ICD-10-CM

## 2024-12-13 DIAGNOSIS — Z23 NEED FOR VACCINATION: ICD-10-CM

## 2024-12-13 PROCEDURE — 99999 PR PBB SHADOW E&M-EST. PATIENT-LVL III: CPT | Mod: PBBFAC,,, | Performed by: HOSPITALIST

## 2024-12-13 RX ORDER — DEXTROAMPHETAMINE SACCHARATE, AMPHETAMINE ASPARTATE, DEXTROAMPHETAMINE SULFATE AND AMPHETAMINE SULFATE 3.75; 3.75; 3.75; 3.75 MG/1; MG/1; MG/1; MG/1
15 TABLET ORAL DAILY
Qty: 30 TABLET | Refills: 0 | Status: SHIPPED | OUTPATIENT
Start: 2024-12-13

## 2024-12-13 NOTE — PROGRESS NOTES
Subjective:     @Patient ID: Hollis Miranda II is a 34 y.o. male.    Chief Complaint: ADHD (/)    HPI    33 yo male with ADHD presents for f/u of ADHD and med refill.   Currently on adderall 15 mg qday   History of Present Illness    CHIEF COMPLAINT:  Mr. Miranda presents today for follow-up regarding ADHD medication.    ADHD:  He reports better response with immediate release Adderall compared to extended release formulation.    BLOOD PRESSURE:  He had elevated blood pressure in July but reports good readings with home monitoring.    MEDICATIONS:  He is uncertain about the refill status of his Cetirizine prescription.    LABS:  Blood work has not been completed.         Review of Systems   Constitutional:  Negative for activity change and unexpected weight change.   HENT:  Negative for hearing loss, rhinorrhea and trouble swallowing.    Eyes:  Negative for discharge and visual disturbance.   Respiratory:  Negative for chest tightness and wheezing.    Cardiovascular:  Negative for chest pain and palpitations.   Gastrointestinal:  Negative for blood in stool, constipation, diarrhea and vomiting.   Endocrine: Negative for polydipsia and polyuria.   Genitourinary:  Negative for difficulty urinating, hematuria and urgency.   Musculoskeletal:  Negative for arthralgias, joint swelling and neck pain.   Neurological:  Negative for weakness and headaches.   Psychiatric/Behavioral:  Negative for confusion and dysphoric mood.      Past medical history, surgical history, and family medical history reviewed and updated as appropriate.    Medications and allergies reviewed.     Objective:     There were no vitals filed for this visit.  There is no height or weight on file to calculate BMI.  Physical Exam  Constitutional:       Appearance: Normal appearance.   HENT:      Head: Normocephalic and atraumatic.   Pulmonary:      Effort: Pulmonary effort is normal.   Neurological:      Mental Status: He is alert and oriented to  person, place, and time.   Psychiatric:         Mood and Affect: Mood normal.         Behavior: Behavior normal.         Lab Results   Component Value Date    WBC 5.4 08/17/2022    HGB 16.4 08/17/2022    HCT 47.9 08/17/2022     08/17/2022    CHOL 200 (H) 08/17/2022    TRIG 196 (H) 08/17/2022    HDL 33 (L) 08/17/2022    ALT 10 08/17/2022    AST 11 08/17/2022     08/17/2022    K 4.2 08/17/2022     08/17/2022    CREATININE 0.86 08/17/2022    BUN 13.0 08/17/2022    CO2 28 08/17/2022    TSH 2.67 08/17/2022       Assessment:     1. ADHD (attention deficit hyperactivity disorder), combined type    2. BP check      Plan:   Diagnoses and all orders for this visit:    ADHD (attention deficit hyperactivity disorder), combined type    BP check      Assessment & Plan    ATTENTION-DEFICIT HYPERACTIVITY DISORDER:   Continued Adderall (immediate release formulation).   Refilled Adderall prescription, to be sent to [facility] on [facility].    BP check  - bp has improved today.   - pt to continue to monitor     ROUTINE HEALTH MAINTENANCE:   CBC, CMP, lipid panel, and thyroid level ordered as routine labs, scheduled for 11/19 at 7:45 AM.   Continued cetirizine, with refills available until July of next year.   Contact [facility] directly for cetirizine refills as needed.    IMMUNIZATION:   Flu shot administered during visit.    FOLLOW-UP:   Follow up in 3 months for virtual visit.           No follow-ups on file.    Ayse Wilson MD  Internal Medicine    12/13/2024    This note was generated with the assistance of ambient listening technology. Verbal consent was obtained by the patient and accompanying visitor(s) for the recording of patient appointment to facilitate this note. I attest to having reviewed and edited the generated note for accuracy, though some syntax or spelling errors may persist. Please contact the author of this note for any clarification.

## 2025-01-06 ENCOUNTER — OFFICE VISIT (OUTPATIENT)
Dept: OPTOMETRY | Facility: CLINIC | Age: 35
End: 2025-01-06
Payer: COMMERCIAL

## 2025-01-06 DIAGNOSIS — H52.13 MYOPIC ASTIGMATISM OF BOTH EYES: Primary | ICD-10-CM

## 2025-01-06 DIAGNOSIS — H52.203 MYOPIC ASTIGMATISM OF BOTH EYES: Primary | ICD-10-CM

## 2025-01-06 DIAGNOSIS — Z97.3 WEARS CONTACT LENSES: ICD-10-CM

## 2025-01-06 DIAGNOSIS — Z46.0 FITTING AND ADJUSTMENT OF SPECTACLES AND CONTACT LENSES: Primary | ICD-10-CM

## 2025-01-06 PROCEDURE — 1159F MED LIST DOCD IN RCRD: CPT | Mod: CPTII,S$GLB,, | Performed by: OPTOMETRIST

## 2025-01-06 PROCEDURE — 92014 COMPRE OPH EXAM EST PT 1/>: CPT | Mod: S$GLB,,, | Performed by: OPTOMETRIST

## 2025-01-06 PROCEDURE — 1160F RVW MEDS BY RX/DR IN RCRD: CPT | Mod: CPTII,S$GLB,, | Performed by: OPTOMETRIST

## 2025-01-06 PROCEDURE — 92015 DETERMINE REFRACTIVE STATE: CPT | Mod: S$GLB,,, | Performed by: OPTOMETRIST

## 2025-01-06 PROCEDURE — 99999 PR PBB SHADOW E&M-EST. PATIENT-LVL II: CPT | Mod: PBBFAC,,, | Performed by: OPTOMETRIST

## 2025-01-06 PROCEDURE — 92310 CONTACT LENS FITTING OU: CPT | Mod: CSM,,, | Performed by: OPTOMETRIST

## 2025-01-06 NOTE — PROGRESS NOTES
HPI    35 Y/o male is here for routine eye exam with no C/o about ocular health   Pt denies pain and discomfort   No f/f    Eye med: no gtt   Last edited by Sadia Herrera MA on 1/6/2025  2:49 PM.            Assessment /Plan     For exam results, see Encounter Report.    Myopic astigmatism of both eyes    Wears contact lenses      Good fit/[facility] w [employer]--pt happy    PLAN:    Wrote spex/CLRx  Continue Daily Wear schedule.  NO SLEEPING IN CONTACT LENSES. Clean and disinfect nightly.  exchange monthly.    Rtc 1 yr

## 2025-03-11 ENCOUNTER — PATIENT MESSAGE (OUTPATIENT)
Dept: INTERNAL MEDICINE | Facility: CLINIC | Age: 35
End: 2025-03-11
Payer: COMMERCIAL

## 2025-03-11 ENCOUNTER — OFFICE VISIT (OUTPATIENT)
Dept: URGENT CARE | Facility: CLINIC | Age: 35
End: 2025-03-11
Payer: COMMERCIAL

## 2025-03-11 VITALS
DIASTOLIC BLOOD PRESSURE: 89 MMHG | WEIGHT: 172 LBS | RESPIRATION RATE: 16 BRPM | HEART RATE: 83 BPM | BODY MASS INDEX: 24.62 KG/M2 | OXYGEN SATURATION: 98 % | SYSTOLIC BLOOD PRESSURE: 134 MMHG | HEIGHT: 70 IN | TEMPERATURE: 98 F

## 2025-03-11 DIAGNOSIS — F90.2 ADHD (ATTENTION DEFICIT HYPERACTIVITY DISORDER), COMBINED TYPE: ICD-10-CM

## 2025-03-11 DIAGNOSIS — Z00.00 ENCOUNTER FOR ANNUAL HEALTH EXAMINATION: Primary | ICD-10-CM

## 2025-03-11 DIAGNOSIS — J31.0 RHINITIS, UNSPECIFIED TYPE: ICD-10-CM

## 2025-03-11 DIAGNOSIS — M79.10 MYALGIA: Primary | ICD-10-CM

## 2025-03-11 DIAGNOSIS — J02.9 SORE THROAT: ICD-10-CM

## 2025-03-11 DIAGNOSIS — J02.0 STREP THROAT: ICD-10-CM

## 2025-03-11 LAB
CTP QC/QA: YES
MOLECULAR STREP A: POSITIVE
POC MOLECULAR INFLUENZA A AGN: NEGATIVE
POC MOLECULAR INFLUENZA B AGN: NEGATIVE
SARS CORONAVIRUS 2 ANTIGEN: NEGATIVE

## 2025-03-11 RX ORDER — IPRATROPIUM BROMIDE 21 UG/1
2 SPRAY, METERED NASAL 2 TIMES DAILY PRN
Qty: 30 ML | Refills: 0 | Status: SHIPPED | OUTPATIENT
Start: 2025-03-11

## 2025-03-11 RX ORDER — AMOXICILLIN 875 MG/1
875 TABLET, FILM COATED ORAL EVERY 12 HOURS
Qty: 20 TABLET | Refills: 0 | Status: SHIPPED | OUTPATIENT
Start: 2025-03-11

## 2025-03-11 NOTE — PROGRESS NOTES
"Subjective:      Patient ID: Hollis Miranda II is a 34 y.o. male.    Vitals:  height is 5' 10" (1.778 m) and weight is 78 kg (172 lb). His respiration is 16.     Chief Complaint: Cough    Pt states sore throat, cough and aches starting yesterday     Cough  This is a new problem. The current episode started yesterday.     Respiratory:  Positive for cough.     Objective:     Physical Exam   Constitutional: He is oriented to person, place, and time. normal  HENT:   Head: Normocephalic and atraumatic.   Ears:   Right Ear: Tympanic membrane, external ear and ear canal normal.   Left Ear: Tympanic membrane, external ear and ear canal normal.   Nose: Rhinorrhea and congestion present.   Mouth/Throat: Posterior oropharyngeal erythema present. No oropharyngeal exudate.   Eyes: Conjunctivae are normal. Pupils are equal, round, and reactive to light. Extraocular movement intact   Neck: Neck supple.   Cardiovascular: Normal rate, regular rhythm, normal heart sounds and normal pulses.   No murmur heard.  Pulmonary/Chest: Effort normal and breath sounds normal. No respiratory distress.   Abdominal: Normal appearance and bowel sounds are normal. Soft. flat abdomen   Musculoskeletal: Normal range of motion.         General: Normal range of motion.   Neurological: no focal deficit. He is alert, oriented to person, place, and time and at baseline.   Skin: Skin is warm and dry. Capillary refill takes less than 2 seconds. No bruising   Psychiatric: His behavior is normal. Mood, judgment and thought content normal.   Nursing note and vitals reviewed.    Assessment:     Plan:   1. Myalgia  - SARS Coronavirus 2 Antigen, POCT Manual Read  - POCT Influenza A/B MOLECULAR    2. Rhinitis, unspecified type  - ipratropium (ATROVENT) 21 mcg (0.03 %) nasal spray; 2 sprays by Each Nostril route 2 (two) times daily as needed.  Dispense: 30 mL; Refill: 0    3. Sore throat  - POCT Strep A, Molecular  - benzocaine-menthoL 15-3.6 mg Lozg; 1 " lozenge by Mucous Membrane route every 8 (eight) hours as needed.  Dispense: 24 lozenge; Refill: 0    4. Strep throat  - amoxicillin (AMOXIL) 875 MG tablet; Take 1 tablet (875 mg total) by mouth every 12 (twelve) hours.  Dispense: 20 tablet; Refill: 0   All results discussed with pt prior to discharge from clinic

## 2025-03-12 NOTE — TELEPHONE ENCOUNTER
LOV with Ayse Wilson MD , 12/13/2024  NOV with Ayse Wilson MD ,  3/24/25    Patient missed lab appointment  Requesting a reschedule  Orders pended for review.

## 2025-03-13 NOTE — TELEPHONE ENCOUNTER
Fasting labs ordered and scheduled as discussed with the patient.  The patient is due for an annual after 07/09/2025.  He will call and schedule at a later date.

## 2025-03-19 ENCOUNTER — LAB VISIT (OUTPATIENT)
Dept: LAB | Facility: HOSPITAL | Age: 35
End: 2025-03-19
Attending: HOSPITALIST
Payer: COMMERCIAL

## 2025-03-19 DIAGNOSIS — Z00.00 ENCOUNTER FOR ANNUAL HEALTH EXAMINATION: ICD-10-CM

## 2025-03-19 DIAGNOSIS — F90.2 ADHD (ATTENTION DEFICIT HYPERACTIVITY DISORDER), COMBINED TYPE: ICD-10-CM

## 2025-03-19 LAB
ALBUMIN SERPL BCP-MCNC: 4.4 G/DL (ref 3.5–5.2)
ALP SERPL-CCNC: 46 U/L (ref 40–150)
ALT SERPL W/O P-5'-P-CCNC: 18 U/L (ref 10–44)
ANION GAP SERPL CALC-SCNC: 10 MMOL/L (ref 8–16)
AST SERPL-CCNC: 16 U/L (ref 10–40)
BASOPHILS # BLD AUTO: 0.05 K/UL (ref 0–0.2)
BASOPHILS NFR BLD: 0.9 % (ref 0–1.9)
BILIRUB SERPL-MCNC: 1 MG/DL (ref 0.1–1)
BUN SERPL-MCNC: 14 MG/DL (ref 6–20)
CALCIUM SERPL-MCNC: 9.3 MG/DL (ref 8.7–10.5)
CHLORIDE SERPL-SCNC: 102 MMOL/L (ref 95–110)
CHOLEST SERPL-MCNC: 182 MG/DL (ref 120–199)
CHOLEST/HDLC SERPL: 4.9 {RATIO} (ref 2–5)
CO2 SERPL-SCNC: 26 MMOL/L (ref 23–29)
CREAT SERPL-MCNC: 0.8 MG/DL (ref 0.5–1.4)
DIFFERENTIAL METHOD BLD: NORMAL
EOSINOPHIL # BLD AUTO: 0.3 K/UL (ref 0–0.5)
EOSINOPHIL NFR BLD: 4.8 % (ref 0–8)
ERYTHROCYTE [DISTWIDTH] IN BLOOD BY AUTOMATED COUNT: 12.9 % (ref 11.5–14.5)
EST. GFR  (NO RACE VARIABLE): >60 ML/MIN/1.73 M^2
GLUCOSE SERPL-MCNC: 89 MG/DL (ref 70–110)
HCT VFR BLD AUTO: 48.3 % (ref 40–54)
HDLC SERPL-MCNC: 37 MG/DL (ref 40–75)
HDLC SERPL: 20.3 % (ref 20–50)
HGB BLD-MCNC: 16.1 G/DL (ref 14–18)
IMM GRANULOCYTES # BLD AUTO: 0.02 K/UL (ref 0–0.04)
IMM GRANULOCYTES NFR BLD AUTO: 0.4 % (ref 0–0.5)
LDLC SERPL CALC-MCNC: 124.2 MG/DL (ref 63–159)
LYMPHOCYTES # BLD AUTO: 2.4 K/UL (ref 1–4.8)
LYMPHOCYTES NFR BLD: 44.1 % (ref 18–48)
MCH RBC QN AUTO: 29.9 PG (ref 27–31)
MCHC RBC AUTO-ENTMCNC: 33.3 G/DL (ref 32–36)
MCV RBC AUTO: 90 FL (ref 82–98)
MONOCYTES # BLD AUTO: 0.4 K/UL (ref 0.3–1)
MONOCYTES NFR BLD: 8.1 % (ref 4–15)
NEUTROPHILS # BLD AUTO: 2.3 K/UL (ref 1.8–7.7)
NEUTROPHILS NFR BLD: 41.7 % (ref 38–73)
NONHDLC SERPL-MCNC: 145 MG/DL
NRBC BLD-RTO: 0 /100 WBC
PLATELET # BLD AUTO: 242 K/UL (ref 150–450)
PMV BLD AUTO: 11.4 FL (ref 9.2–12.9)
POTASSIUM SERPL-SCNC: 3.5 MMOL/L (ref 3.5–5.1)
PROT SERPL-MCNC: 7.8 G/DL (ref 6–8.4)
RBC # BLD AUTO: 5.38 M/UL (ref 4.6–6.2)
SODIUM SERPL-SCNC: 138 MMOL/L (ref 136–145)
TRIGL SERPL-MCNC: 104 MG/DL (ref 30–150)
TSH SERPL DL<=0.005 MIU/L-ACNC: 3.3 UIU/ML (ref 0.4–4)
WBC # BLD AUTO: 5.42 K/UL (ref 3.9–12.7)

## 2025-03-19 PROCEDURE — 80061 LIPID PANEL: CPT | Performed by: HOSPITALIST

## 2025-03-19 PROCEDURE — 80053 COMPREHEN METABOLIC PANEL: CPT | Performed by: HOSPITALIST

## 2025-03-19 PROCEDURE — 84443 ASSAY THYROID STIM HORMONE: CPT | Performed by: HOSPITALIST

## 2025-03-19 PROCEDURE — 36415 COLL VENOUS BLD VENIPUNCTURE: CPT | Mod: PO | Performed by: HOSPITALIST

## 2025-03-19 PROCEDURE — 85025 COMPLETE CBC W/AUTO DIFF WBC: CPT | Performed by: HOSPITALIST

## 2025-03-24 ENCOUNTER — OFFICE VISIT (OUTPATIENT)
Dept: INTERNAL MEDICINE | Facility: CLINIC | Age: 35
End: 2025-03-24
Payer: COMMERCIAL

## 2025-03-24 DIAGNOSIS — F90.2 ADHD (ATTENTION DEFICIT HYPERACTIVITY DISORDER), COMBINED TYPE: Primary | ICD-10-CM

## 2025-03-24 RX ORDER — DEXTROAMPHETAMINE SACCHARATE, AMPHETAMINE ASPARTATE, DEXTROAMPHETAMINE SULFATE AND AMPHETAMINE SULFATE 3.75; 3.75; 3.75; 3.75 MG/1; MG/1; MG/1; MG/1
15 TABLET ORAL DAILY
Qty: 30 TABLET | Refills: 0 | Status: SHIPPED | OUTPATIENT
Start: 2025-03-24

## 2025-03-24 NOTE — PROGRESS NOTES
Virtual Visit  The patient location is: [place]   The chief complaint leading to consultation is: f/u   Visit type: Virtual visit with synchronous audio and video  Total time spent with patient: 7 min  Each patient to whom he or she provides medical services by telemedicine is:  (1) informed of the relationship between the physician and patient and the respective role of any other health care provider with respect to management of the patient; and (2) notified that he or she may decline to receive medical services by telemedicine and may withdraw from such care at any time.    Subjective:      Subjective:     @Patient ID: Hollis Miranda II is a 34 y.o. male.    Chief Complaint: Follow-up (Medication refill)    Follow-up        History of Present Illness    CHIEF COMPLAINT:  Mr. Miranda presents today for three month follow-up of ADHD medication    ADHD:  He reports Adderall is working well for ADHD management without side effects, though he has experienced occasional pharmacy stock shortages requiring pharmacy changes.    LABS:  Thyroid level was normal. Cholesterol panel showed slightly low HDL with normal LDL and total cholesterol.           Review of Systems   Psychiatric/Behavioral:  Negative for agitation and confusion.      Past medical history, surgical history, and family medical history reviewed and updated as appropriate.    Medications and allergies reviewed.     Objective:     There were no vitals filed for this visit.  There is no height or weight on file to calculate BMI.  Physical Exam  Constitutional:       Appearance: Normal appearance.   HENT:      Head: Normocephalic and atraumatic.   Pulmonary:      Effort: Pulmonary effort is normal.   Neurological:      Mental Status: He is alert and oriented to person, place, and time.   Psychiatric:         Mood and Affect: Mood normal.         Behavior: Behavior normal.         Lab Results   Component Value Date    WBC 5.42 03/19/2025    HGB 16.1  "03/19/2025    HCT 48.3 03/19/2025     03/19/2025    CHOL 182 03/19/2025    TRIG 104 03/19/2025    HDL 37 (L) 03/19/2025    ALT 18 03/19/2025    AST 16 03/19/2025     03/19/2025    K 3.5 03/19/2025     03/19/2025    CREATININE 0.8 03/19/2025    BUN 14 03/19/2025    CO2 26 03/19/2025    TSH 3.303 03/19/2025       Assessment:     1. ADHD (attention deficit hyperactivity disorder), combined type      Plan:   Hollis was seen today for follow-up.    Diagnoses and all orders for this visit:    ADHD (attention deficit hyperactivity disorder), combined type  -     dextroamphetamine-amphetamine (ADDERALL) 15 mg tablet; Take 1 tablet (15 mg total) by mouth once daily.       Assessment & Plan    ATTENTION-DEFICIT HYPERACTIVITY DISORDER (ADHD):   Evaluated the patient during a 3-month follow-up for ADHD medication management.   Assessed that the Adderall prescription has been effective in improving focus for ADHD, with no reported issues.   Discussed medication availability and pharmacy options due to national shortages of ADHD medications.   Continued Adderall prescription for ADHD management.   Instructed to send the prescription to [facility] pharmacy.  I have reviewed the . Pt appears compliant with medication. Rx sent to pharmacy.        - Reviewed lab results with patient    Assessed that HDL is slightly lower than normal, but overall cholesterol numbers are good.   Explained HDL as the "good cholesterol" to the patient.   Reassured the patient that the slightly low HDL is not concerning due to overall good cholesterol numbers.   Recommend dietary changes to boost HDL levels, including foods with beneficial omega 3 fatty acids such as salmon/fish, and avocados.                    Ayse Wilson MD  Internal Medicine    3/24/2025    "